# Patient Record
Sex: FEMALE | Race: WHITE | NOT HISPANIC OR LATINO | Employment: OTHER | URBAN - NONMETROPOLITAN AREA
[De-identification: names, ages, dates, MRNs, and addresses within clinical notes are randomized per-mention and may not be internally consistent; named-entity substitution may affect disease eponyms.]

---

## 2018-01-31 ENCOUNTER — PROCEDURE VISIT (OUTPATIENT)
Dept: INTERNAL MEDICINE | Facility: CLINIC | Age: 58
End: 2018-01-31

## 2018-01-31 VITALS
SYSTOLIC BLOOD PRESSURE: 130 MMHG | BODY MASS INDEX: 31.22 KG/M2 | WEIGHT: 187.4 LBS | DIASTOLIC BLOOD PRESSURE: 80 MMHG | HEIGHT: 65 IN

## 2018-01-31 DIAGNOSIS — R53.83 FATIGUE, UNSPECIFIED TYPE: ICD-10-CM

## 2018-01-31 DIAGNOSIS — Z01.419 ENCOUNTER FOR WELL WOMAN EXAM WITH ROUTINE GYNECOLOGICAL EXAM: Primary | ICD-10-CM

## 2018-01-31 DIAGNOSIS — Z12.4 ENCOUNTER FOR PAPANICOLAOU SMEAR FOR CERVICAL CANCER SCREENING: ICD-10-CM

## 2018-01-31 DIAGNOSIS — Z12.31 ENCOUNTER FOR SCREENING MAMMOGRAM FOR BREAST CANCER: ICD-10-CM

## 2018-01-31 DIAGNOSIS — Z12.11 SCREENING FOR COLON CANCER: ICD-10-CM

## 2018-01-31 PROCEDURE — 88142 CYTOPATH C/V THIN LAYER: CPT | Performed by: INTERNAL MEDICINE

## 2018-01-31 PROCEDURE — 99396 PREV VISIT EST AGE 40-64: CPT | Performed by: INTERNAL MEDICINE

## 2018-01-31 NOTE — PROGRESS NOTES
"Subjective   History of Present Illness    Nohelia Yo is a 57 y.o. female who presents for annual exam.      Patient denies any gyn complaints. .Denies pelvic pain, vaginal discharge or abnormal bleeding.    Sexually active?: Yes  Postmenopausal?: Yes  HRT?: Yes  Hysterectomy?: No    Date last pap:   History of abnormal Pap smear:No  Date of last mammogram:   History of abnormal mammogram: Yes. Patient had left breast biopsy in . Pathology - fibrocystic changes.  No family history of breast cancer or GYN cancer.    Colonoscopy - patient has never had colonoscopy.  She denies abdominal pain, nausea or vomiting. Denies constipation or diarrhea.  No family history of colon cancer.    Patient complains of fatigue and daytime somnolence.  She snores a lot but no witnessed episodes of apnea.      /80  Ht 164 cm (64.57\")  Wt 85 kg (187 lb 6.4 oz)  BMI 31.6 kg/m2    Past Medical History:   Diagnosis Date   • Abnormal computed tomography scan     - hilar and mediastinal lymphadenopathy      • Abnormal mammography     microcalcifications,left      • Dyspnea    • Extrinsic allergic alveolitis    • Hallux valgus        Past Surgical History:   Procedure Laterality Date   • BREAST BIOPSY  10/19/2009    Biop of L using percut vacuum assisted rotating device & image guidance. Placement of metallic localization clip. Stereotactic location guidance w/ radiologic supervision & interpretation. radiologic exam of surgical specimen Abn L micro calcificat   • FOOT SURGERY  2012    1st metatarsal osteotomy,right foot. Metatarsalgia with shortened 1st metatarsal   • METATARSAL OSTEOTOMY  2012    First metatarsal osteotomy, right foot       The following portions of the patient's history were reviewed and updated as appropriate: allergies, current medications, past family history, past medical history, past social history, past surgical history and problem list.    Review of " Systems    Constitutional:  Positive for fatigue. No weight loss, No weight gain, No fever and No chills   Respiratory: No dyspnea, No cough, No hemopytysis, No wheezing and No pleuritic pain   Cardiovascular: No chest pain, No palpitations, No arrhythmia, No orthopnea, No noctural dyspnea, No edema and No claudication   Breasts: No discharge from nipple, No breast tenderness and No breast mass   Gastrointestinal: No loss of appetite, No dysphagia, No abdominal pain, No nausea, No vomiting, No change in bowel habits, No diarrhea, No constipation and No blood in stool   Genitourinary: No increased frequency of urination, No dysuria, No hematuria, No nocturia, No urinary incontinence, No vaginal discharge, No abnormal vaginal bleeding, No pelvic pain, No menstrual problem and No menopausal problem   Skin: No skin rash, No skin lesion, No dry skin, No pruritus and No nail problem   Neurologic: No headache, No dizziness, No lightheadedness, No syncope, No vertigo, No weakness, No numbness, No tremor and No paresthesia   Psychiatric: No difficulty sleeping, No mood swings, No feeling anxious, No confusion and No memory loss          Objective   Physical Exam    General:  Alert and oriented x 3, Cooperative, Well developed & well nourished and No acute distress   HEENT: PERRLA, EOMI,Throat - clear   Lungs: Lungs, clear, good effort   Cardiac: Regular, no murmur or rub   Abdomen: Soft, nondistended, non-tender, without masses or organomegaly   Breast: Inspection negative, no nipple discharge or bleeding, no masses or nodularity palpable and Symmetrical breasts in shape, size, consistency   Genitourinary: Vulva normal, vaginal mucosa normal, bladder nondistended and nontender, cervix normal, uterus normal size and nontender, no adnexal/pelvic tenderness or masses, Bartholin's/[place]/Urethral gland WNL PAP smear obtained   Skin: Skin warm and dry and Pattern of hair growth normal       Assessment/Plan   Nohelia was seen  today for annual exam.    Diagnoses and all orders for this visit:    Encounter for well woman exam with routine gynecological exam  -     Liquid-based Pap Smear, Screening - , Cervix    Encounter for screening mammogram for breast cancer  -     Mammo Screening Digital Tomosynthesis Bilateral With CAD    Encounter for Papanicolaou smear for cervical cancer screening  -     Liquid-based Pap Smear, Screening - , Cervix    Screening for colon cancer  -     Ambulatory Referral to General Surgery    Fatigue, unspecified type  -     Ambulatory Referral to Sleep Medicine      All questions answered.  Recommended self breast exam  Pap smear obtained.  Mammogram.  Discussed healthy lifestyle modifications.  Recommended weight loss and regular physical activity.   Patient is referred for screening colonoscopy.   Sleep apnea evaluation.

## 2018-02-02 LAB
LAB AP CASE REPORT: NORMAL
LAB AP GYN ADDITIONAL INFORMATION: NORMAL
LAB AP GYN OTHER FINDINGS: NORMAL
Lab: NORMAL
PATH INTERP SPEC-IMP: NORMAL
STAT OF ADQ CVX/VAG CYTO-IMP: NORMAL

## 2018-02-16 ENCOUNTER — PREP FOR SURGERY (OUTPATIENT)
Dept: OTHER | Facility: HOSPITAL | Age: 58
End: 2018-02-16

## 2018-02-16 DIAGNOSIS — Z12.11 SCREENING FOR COLON CANCER: Primary | ICD-10-CM

## 2018-02-16 RX ORDER — DEXTROSE AND SODIUM CHLORIDE 5; .45 G/100ML; G/100ML
30 INJECTION, SOLUTION INTRAVENOUS CONTINUOUS PRN
Status: CANCELLED | OUTPATIENT
Start: 2018-02-16

## 2018-03-27 ENCOUNTER — ANESTHESIA EVENT (OUTPATIENT)
Dept: GASTROENTEROLOGY | Facility: HOSPITAL | Age: 58
End: 2018-03-27

## 2018-03-27 ENCOUNTER — HOSPITAL ENCOUNTER (OUTPATIENT)
Facility: HOSPITAL | Age: 58
Setting detail: HOSPITAL OUTPATIENT SURGERY
Discharge: HOME | End: 2018-03-27
Attending: SURGERY | Admitting: SURGERY

## 2018-03-27 ENCOUNTER — ANESTHESIA (OUTPATIENT)
Dept: GASTROENTEROLOGY | Facility: HOSPITAL | Age: 58
End: 2018-03-27

## 2018-03-27 VITALS
SYSTOLIC BLOOD PRESSURE: 120 MMHG | HEIGHT: 63 IN | TEMPERATURE: 97.5 F | WEIGHT: 185.19 LBS | BODY MASS INDEX: 32.81 KG/M2 | HEART RATE: 77 BPM | OXYGEN SATURATION: 99 % | DIASTOLIC BLOOD PRESSURE: 69 MMHG | RESPIRATION RATE: 16 BRPM

## 2018-03-27 PROCEDURE — 25010000002 PROPOFOL 10 MG/ML EMULSION: Performed by: NURSE ANESTHETIST, CERTIFIED REGISTERED

## 2018-03-27 PROCEDURE — 45378 DIAGNOSTIC COLONOSCOPY: CPT | Performed by: SURGERY

## 2018-03-27 PROCEDURE — 25010000002 MIDAZOLAM PER 1 MG: Performed by: NURSE ANESTHETIST, CERTIFIED REGISTERED

## 2018-03-27 PROCEDURE — 25010000002 FENTANYL CITRATE (PF) 100 MCG/2ML SOLUTION: Performed by: NURSE ANESTHETIST, CERTIFIED REGISTERED

## 2018-03-27 RX ORDER — DEXTROSE AND SODIUM CHLORIDE 5; .45 G/100ML; G/100ML
30 INJECTION, SOLUTION INTRAVENOUS CONTINUOUS
Status: DISCONTINUED | OUTPATIENT
Start: 2018-03-27 | End: 2018-03-27 | Stop reason: HOSPADM

## 2018-03-27 RX ORDER — FENTANYL CITRATE 50 UG/ML
INJECTION, SOLUTION INTRAMUSCULAR; INTRAVENOUS AS NEEDED
Status: DISCONTINUED | OUTPATIENT
Start: 2018-03-27 | End: 2018-03-27 | Stop reason: SURG

## 2018-03-27 RX ORDER — LIDOCAINE HYDROCHLORIDE 10 MG/ML
INJECTION, SOLUTION INFILTRATION; PERINEURAL AS NEEDED
Status: DISCONTINUED | OUTPATIENT
Start: 2018-03-27 | End: 2018-03-27 | Stop reason: SURG

## 2018-03-27 RX ORDER — MIDAZOLAM HYDROCHLORIDE 1 MG/ML
INJECTION INTRAMUSCULAR; INTRAVENOUS AS NEEDED
Status: DISCONTINUED | OUTPATIENT
Start: 2018-03-27 | End: 2018-03-27 | Stop reason: SURG

## 2018-03-27 RX ORDER — PROPOFOL 10 MG/ML
VIAL (ML) INTRAVENOUS AS NEEDED
Status: DISCONTINUED | OUTPATIENT
Start: 2018-03-27 | End: 2018-03-27 | Stop reason: SURG

## 2018-03-27 RX ADMIN — PROPOFOL 70 MG: 10 INJECTION, EMULSION INTRAVENOUS at 08:20

## 2018-03-27 RX ADMIN — FENTANYL CITRATE 25 MCG: 50 INJECTION, SOLUTION INTRAMUSCULAR; INTRAVENOUS at 08:17

## 2018-03-27 RX ADMIN — DEXTROSE AND SODIUM CHLORIDE 30 ML/HR: 5; 450 INJECTION, SOLUTION INTRAVENOUS at 07:34

## 2018-03-27 RX ADMIN — PROPOFOL 30 MG: 10 INJECTION, EMULSION INTRAVENOUS at 08:45

## 2018-03-27 RX ADMIN — DEXTROSE AND SODIUM CHLORIDE: 5; 450 INJECTION, SOLUTION INTRAVENOUS at 08:08

## 2018-03-27 RX ADMIN — MIDAZOLAM 0.5 MG: 1 INJECTION INTRAMUSCULAR; INTRAVENOUS at 08:17

## 2018-03-27 RX ADMIN — LIDOCAINE HYDROCHLORIDE 50 MG: 10 INJECTION, SOLUTION INFILTRATION; PERINEURAL at 08:16

## 2018-03-27 RX ADMIN — PROPOFOL 50 MG: 10 INJECTION, EMULSION INTRAVENOUS at 08:30

## 2018-03-27 NOTE — ANESTHESIA POSTPROCEDURE EVALUATION
Patient: Nohelia Yo    Procedure Summary     Date:  03/27/18 Room / Location:  [facility] ENDOSCOPY 1 / [facility]COPY    Anesthesia Start:  0815 Anesthesia Stop:  0850    Procedure:  COLONOSCOPY (N/A ) Diagnosis:       Colon cancer screening      (Colon cancer screening [Z12.11])    Surgeon:  Cam Salgado MD Provider:  Lawanda Jackson CRNA    Anesthesia Type:  MAC ASA Status:  2          Anesthesia Type: MAC  Last vitals  BP   151/82 (03/27/18 0720)   Temp   96.7 °F (35.9 °C) (03/27/18 0720)   Pulse   63 (03/27/18 0720)   Resp   18 (03/27/18 0720)     SpO2   96 % (03/27/18 0720)     Post Anesthesia Care and Evaluation    Patient location during evaluation: bedside  Patient participation: complete - patient participated  Level of consciousness: awake and alert  Pain management: adequate  Airway patency: patent  Anesthetic complications: No anesthetic complications    Cardiovascular status: acceptable  Respiratory status: acceptable  Hydration status: acceptable

## 2018-03-27 NOTE — ANESTHESIA PREPROCEDURE EVALUATION
Anesthesia Evaluation                  Airway   Mallampati: II  TM distance: <3 FB  Neck ROM: full  No difficulty expected  Dental - normal exam     Pulmonary - normal exam   (+) shortness of breath,   Cardiovascular - normal exam        Neuro/Psych  GI/Hepatic/Renal/Endo      Musculoskeletal     Abdominal  - normal exam   Substance History      OB/GYN          Other                        Anesthesia Plan    ASA 2     MAC     intravenous induction   Anesthetic plan and risks discussed with patient.

## 2018-03-27 NOTE — H&P
Chief Complaint: Need for Screening Colonoscopy    Nohelia Yo is a 58 y.o. female referred today for evaluation for colonoscopy.  She notes no change in bowel habits, no blood in the stool.     Prior Colonoscopy:no  Prior Polyps:no  Family History of Colon Cancer:no  On anticoagulation/antiplatelets:no    Past Surgical History:   Procedure Laterality Date   • BREAST BIOPSY  10/19/2009    Biop of L using percut vacuum assisted rotating device & image guidance. Placement of metallic localization clip. Stereotactic location guidance w/ radiologic supervision & interpretation. radiologic exam of surgical specimen Abn L micro calcificat   • FOOT SURGERY  08/21/2012    1st metatarsal osteotomy,right foot. Metatarsalgia with shortened 1st metatarsal   • METATARSAL OSTEOTOMY  08/21/2012    First metatarsal osteotomy, right foot     Past Medical History:   Diagnosis Date   • Abnormal computed tomography scan     - hilar and mediastinal lymphadenopathy      • Abnormal mammography     microcalcifications,left      • Dyspnea    • Extrinsic allergic alveolitis    • Hallux valgus      Social History     Social History   • Marital status:      Spouse name: N/A   • Number of children: N/A   • Years of education: N/A     Occupational History   • Not on file.     Social History Main Topics   • Smoking status: Never Smoker   • Smokeless tobacco: Never Used   • Alcohol use No   • Drug use: No   • Sexual activity: Defer     Other Topics Concern   • Not on file     Social History Narrative   • No narrative on file     No current facility-administered medications on file prior to encounter.      No current outpatient prescriptions on file prior to encounter.     No Known Allergies      Review of Systems   Constitutional: Negative for appetite change, chills, fever and unexpected weight change.   HENT: Negative for hearing loss, nosebleeds and trouble swallowing.    Eyes: Negative for visual disturbance.   Respiratory:  Negative for apnea, cough, choking, chest tightness, shortness of breath, wheezing and stridor.    Cardiovascular: Negative for chest pain, palpitations and leg swelling.   Gastrointestinal: Negative for abdominal distention, abdominal pain, blood in stool, constipation, diarrhea, nausea and vomiting.   Endocrine: Negative for cold intolerance, heat intolerance, polydipsia, polyphagia and polyuria.   Genitourinary: Negative for difficulty urinating, dysuria, frequency, hematuria and urgency.   Musculoskeletal: Negative for arthralgias, back pain, myalgias and neck pain.   Skin: Negative for color change, pallor and rash.   Allergic/Immunologic: Negative for immunocompromised state.   Neurological: Negative for dizziness, seizures, syncope, light-headedness, numbness and headaches.   Hematological: Negative for adenopathy.   Psychiatric/Behavioral: Negative for suicidal ideas. The patient is not nervous/anxious.        Vitals:    03/27/18 0720   BP: 151/82   Pulse: 63   Resp: 18   Temp: 96.7 °F (35.9 °C)   SpO2: 96%       Physical Exam:       General Appearance:    Alert, cooperative, in no acute distress   Head:    Normocephalic, without obvious abnormality, atraumatic   Eyes:            Lids and lashes normal, conjunctivae and sclerae normal, no   icterus, no pallor, corneas clear, PERRLA   Ears:    Ears appear intact with no abnormalities noted   Throat:   No oral lesions, no thrush, oral mucosa moist   Neck:   No adenopathy, supple, trachea midline, no thyromegaly, no   carotid bruit, no JVD   Back:     No kyphosis present, no scoliosis present, no skin lesions,      erythema or scars, no tenderness to percussion or                   palpation, range of motion normal   Lungs:     Clear to auscultation,respirations regular, even and                  unlabored    Heart:    Regular rhythm and normal rate, normal S1 and S2, no            murmur, no gallop, no rub, no click   Chest Wall:    No abnormalities observed    Abdomen:     Normal bowel sounds, no masses, no organomegaly, soft        non-tender, non-distended, no guarding, no rebound                Tenderness   Extremities:   Moves all extremities well, no edema, no cyanosis, no             redness   Pulses:   Pulses palpable and equal bilaterally   Skin:   No bleeding, bruising or rash   Lymph nodes:   No palpable adenopathy   Neurologic:   Cranial nerves 2 - 12 grossly intact, sensation intact, DTR       present and equal bilaterally       Assessment     In need of screening colonoscopy.    Plan     Risks, benefits, rationale and prep for colonoscopy have been discussed with the patient.  The patient indicates understanding of these issues and agrees with the plan.          This document has been electronically signed by Cam Salgado MD on March 27, 2018 8:11 AM

## 2018-04-07 DIAGNOSIS — Z02.1 PRE-EMPLOYMENT EXAMINATION: ICD-10-CM

## 2018-04-07 DIAGNOSIS — R53.83 FATIGUE, UNSPECIFIED TYPE: Primary | ICD-10-CM

## 2018-04-10 ENCOUNTER — CONSULT (OUTPATIENT)
Dept: SLEEP MEDICINE | Facility: HOSPITAL | Age: 58
End: 2018-04-10

## 2018-04-10 VITALS
BODY MASS INDEX: 32.43 KG/M2 | DIASTOLIC BLOOD PRESSURE: 78 MMHG | SYSTOLIC BLOOD PRESSURE: 124 MMHG | HEART RATE: 85 BPM | WEIGHT: 183 LBS | OXYGEN SATURATION: 98 % | HEIGHT: 63 IN

## 2018-04-10 DIAGNOSIS — G47.33 OBSTRUCTIVE SLEEP APNEA, ADULT: Primary | ICD-10-CM

## 2018-04-10 PROCEDURE — 99204 OFFICE O/P NEW MOD 45 MIN: CPT | Performed by: INTERNAL MEDICINE

## 2018-04-10 NOTE — PROGRESS NOTES
New Patient Sleep Medicine Consultation    Encounter Date: 4/10/2018         Patient's PCP: Alondra Dean MD  Referring provider: Alondra Dean MD  Reason for consultation: snoring and excessive daytime sleepiness    Nohelia Yo is a 58 y.o. female who presents with above complaints for many years. She  admits to daytime fatigue, working night shift, muscle cramps at night, bruxism, restless sleep, and non-restorative sleep. 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 She  falls asleep after 5 minutes, and is up 3-5 times per night. She wakes up at various times. She drinks 1 cup of coffee when working, 4 teas, and 0 sodas per day. She drinks 0 alcoholic beverages per week. She does not smoke. She denies abnormal dreams, cataplexy, sleep paralysis, or hypnagogic hallucinations. She does not take sedatives or hypnotics. She has no sleepiness with driving. She rarely naps. Restless legs symptoms are not present.    Nunda - 8    Past Medical History:   Diagnosis Date   • Abnormal computed tomography scan     - hilar and mediastinal lymphadenopathy      • Abnormal mammography     microcalcifications,left      • Dyspnea    • Extrinsic allergic alveolitis    • Hallux valgus      Social History     Social History   • Marital status:      Spouse name: N/A   • Number of children: N/A   • Years of education: N/A     Occupational History   • Not on file.     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 Patient advised to discuss any positive ROS with PCP.      Vitals:    04/10/18 0828   BP: 124/78   Pulse: 85   SpO2: 98%     Body mass index is 32.42 kg/m². Discussed the patient's BMI with her. BMI is above normal parameters. Follow-up plan includes:  referral to primary care.  Neck circumference: 14.5\"            General: Alert. Cooperative. Well developed. No acute distress.             Head:  Normocephalic. Symmetrical. Atraumatic.              Eyes: Sclera clear. No icterus. PERRLA. Normal EOM.             Ears: No deformities. Normal hearing.             Nose: No septal deviation. No drainage.          Throat: No oral lesions. No thrush. Moist mucous membranes.    Tongue is normal    Dentition is good       Pharynx: Posterior pharyngeal pillars are narrow    Mallampati score of IV (only hard palate visible)    Pharynx is normal with unrermarkable tonsils   Chest Wall:  Normal shape. Symmetric expansion with respiration. No tenderness.             Neck:  Trachea midline.           Lungs:  Clear to auscultation bilaterally. No wheezes. No rhonchi. No rales. Respirations regular, even and unlabored.            Heart:  Regular rhythm and normal rate. Normal S1 and S2. No murmur.     Abdomen:  Soft, non-tender and non-distended. Normal bowel sounds. No masses.  Extremities:  Moves all extremities well. No edema.           Pulses: Pulses palpable and equal bilaterally.               Skin: Dry. Intact. No bleeding. No rash.           Neuro: Moves all 4 extremities and cranial nerves grossly intact.  Psychiatric: Normal mood and affect.      ASSESSMENT:  1. Obstructive sleep apnea   1. Home sleep study           This document has been electronically signed by Rik Avina MD on April 10, 2018         CC: MD Dianne Vinson, MD Alondra  "

## 2018-04-15 RX ORDER — ATOVAQUONE AND PROGUANIL HYDROCHLORIDE 250; 100 MG/1; MG/1
1 TABLET, FILM COATED ORAL
Qty: 30 TABLET | Refills: 0 | Status: SHIPPED | OUTPATIENT
Start: 2018-04-15 | End: 2019-06-20

## 2018-05-21 ENCOUNTER — HOSPITAL ENCOUNTER (OUTPATIENT)
Dept: SLEEP MEDICINE | Facility: HOSPITAL | Age: 58
Discharge: HOME | End: 2018-05-21

## 2018-06-05 ENCOUNTER — HOSPITAL ENCOUNTER (OUTPATIENT)
Dept: SLEEP MEDICINE | Facility: HOSPITAL | Age: 58
Discharge: HOME | End: 2018-06-05
Attending: INTERNAL MEDICINE | Admitting: INTERNAL MEDICINE

## 2018-06-05 DIAGNOSIS — G47.33 OBSTRUCTIVE SLEEP APNEA, ADULT: ICD-10-CM

## 2018-06-05 PROCEDURE — 95806 SLEEP STUDY UNATT&RESP EFFT: CPT

## 2018-06-05 PROCEDURE — 95806 SLEEP STUDY UNATT&RESP EFFT: CPT | Performed by: INTERNAL MEDICINE

## 2018-06-19 ENCOUNTER — OFFICE VISIT (OUTPATIENT)
Dept: SLEEP MEDICINE | Facility: HOSPITAL | Age: 58
End: 2018-06-19

## 2018-06-19 VITALS
DIASTOLIC BLOOD PRESSURE: 82 MMHG | HEIGHT: 63 IN | SYSTOLIC BLOOD PRESSURE: 148 MMHG | WEIGHT: 183 LBS | BODY MASS INDEX: 32.43 KG/M2

## 2018-06-19 DIAGNOSIS — G47.26 CIRCADIAN RHYTHM SLEEP DISORDER, SHIFT WORK TYPE: ICD-10-CM

## 2018-06-19 DIAGNOSIS — G47.19 EXCESSIVE DAYTIME SLEEPINESS: Primary | ICD-10-CM

## 2018-06-19 DIAGNOSIS — G47.25 CIRCADIAN RHYTHM SLEEP DISORDER, JET LAG TYPE: ICD-10-CM

## 2018-06-19 PROCEDURE — 99213 OFFICE O/P EST LOW 20 MIN: CPT | Performed by: INTERNAL MEDICINE

## 2018-06-19 NOTE — PROGRESS NOTES
CHIEF COMPLAINT: follow up home sleep study    HPI:    The patient is a 58 y.o. female.  She returns to sleep clinic to discuss the results of the recent home sleep study performed on 06/05/2018.  The AHI/IAIN was 3.3. Patient reports difficulty falling asleep on study night. She was reading in bed and had trouble staying asleep. She reports ~ 3 hours of sleep that night.    INTERVAL MEDICAL HISTORY: no change since last visit.    MEDICATIONS:   Current Outpatient Prescriptions:   •  atovaquone-proguanil (MALARONE) 250-100 MG tablet per tablet, Take 1 tablet by mouth Daily., Disp: 30 tablet, Rfl: 0    PHYSICAL EXAM  Vital Signs (last 24 hours)       06/18 0700  -  06/19 0659 06/19 0700  -  06/19 0948   Most Recent    BP     148/82     148/82          Gen:  No distress, appears stated age, alert, oriented to person, place, and time  Heent:   NC/AT, PERRLA, EOMI, anicteric sclera    External ears/nose normal, OP clear, Mallamati 3  LUNGS: Clear breath sounds bilaterally, nonlabored breathing  CV:  Normal S1/S2, without murmur, no peripheral edema  ABD:  Non tender, non distended, bowel sounds not appreciated  EXT:  No cyanosis or clubbing      IMPRESSION AND PLAN:    1. Excessive daytime sleepiness  1. HST with short sleep time  1. Check in lab PSG with MSLT  2. Circadian rhythm disorder  1. Shift work sleep disorder  2. Jet lag - spends up to 3 nights in her bed, travels for [place], pleasure    All of the patient's questions were answered. She states understanding and agreement with my assessment and plan as above.  Total time 15 min, more than half spent in face to face counseling and coordination of care.    RTC 2 weeks after testing    She agrees to return sooner on a prn basis.             This document has been electronically signed by Rik Avina MD on June 19, 2018           CC: Alondra Dean MD          No ref. provider found

## 2018-09-01 RX ORDER — ACETAZOLAMIDE 500 MG/1
500 CAPSULE, EXTENDED RELEASE ORAL 2 TIMES DAILY
Qty: 60 CAPSULE | Refills: 2 | Status: SHIPPED | OUTPATIENT
Start: 2018-09-01 | End: 2019-06-20

## 2018-09-05 DIAGNOSIS — G47.19 EXCESSIVE DAYTIME SLEEPINESS: Primary | ICD-10-CM

## 2018-09-18 ENCOUNTER — HOSPITAL ENCOUNTER (OUTPATIENT)
Dept: SLEEP MEDICINE | Facility: HOSPITAL | Age: 58
Discharge: HOME | End: 2018-09-18

## 2018-09-20 ENCOUNTER — TELEPHONE (OUTPATIENT)
Dept: SLEEP MEDICINE | Facility: HOSPITAL | Age: 58
End: 2018-09-20

## 2018-09-20 DIAGNOSIS — G47.33 OSA (OBSTRUCTIVE SLEEP APNEA): Primary | ICD-10-CM

## 2018-09-20 NOTE — TELEPHONE ENCOUNTER
----- Message from Wendy Nava sent at 9/17/2018  2:20 PM CDT -----  Regarding: SLEEP STUDIES  Contact: [phone]  Patient called canceled sleep studies and wants to do another HST. She said she could not afford the cost of the studies. She had a HST done 6/058/2018  '  9/20/2018  9:24 AM  Patient's symptoms persists -will repeat HST

## 2018-12-11 ENCOUNTER — HOSPITAL ENCOUNTER (OUTPATIENT)
Dept: SLEEP MEDICINE | Facility: HOSPITAL | Age: 58
Discharge: HOME | End: 2018-12-11
Admitting: INTERNAL MEDICINE

## 2018-12-11 DIAGNOSIS — G47.33 OSA (OBSTRUCTIVE SLEEP APNEA): ICD-10-CM

## 2018-12-11 PROCEDURE — 95806 SLEEP STUDY UNATT&RESP EFFT: CPT | Performed by: INTERNAL MEDICINE

## 2018-12-11 PROCEDURE — 95806 SLEEP STUDY UNATT&RESP EFFT: CPT

## 2019-01-17 DIAGNOSIS — R53.83 OTHER FATIGUE: Primary | ICD-10-CM

## 2019-06-20 ENCOUNTER — OFFICE VISIT (OUTPATIENT)
Dept: FAMILY MEDICINE CLINIC | Facility: CLINIC | Age: 59
End: 2019-06-20

## 2019-06-20 VITALS
DIASTOLIC BLOOD PRESSURE: 88 MMHG | SYSTOLIC BLOOD PRESSURE: 140 MMHG | WEIGHT: 178 LBS | BODY MASS INDEX: 30.39 KG/M2 | HEIGHT: 64 IN

## 2019-06-20 DIAGNOSIS — M54.2 NECK PAIN: ICD-10-CM

## 2019-06-20 DIAGNOSIS — Z12.31 ENCOUNTER FOR SCREENING MAMMOGRAM FOR MALIGNANT NEOPLASM OF BREAST: ICD-10-CM

## 2019-06-20 DIAGNOSIS — N62 LARGE BREASTS: ICD-10-CM

## 2019-06-20 DIAGNOSIS — Z00.00 WELLNESS EXAMINATION: Primary | ICD-10-CM

## 2019-06-20 PROBLEM — Z76.89 ENCOUNTER TO ESTABLISH CARE: Status: ACTIVE | Noted: 2019-06-20

## 2019-06-20 PROCEDURE — 99396 PREV VISIT EST AGE 40-64: CPT | Performed by: NURSE PRACTITIONER

## 2019-06-20 NOTE — PROGRESS NOTES
Chief Complaint   Patient presents with   • [facility] Care     Dr Dean patient     Subjective   Nohelia Yo is a 59 y.o. female.     Presents with needing to est care-reports neck pain and large breast tissue affecting her daily life-workouts, neck pain and now has headaches       Neck Pain    This is a recurrent problem. The current episode started more than 1 year ago. The problem occurs daily. The problem has been gradually worsening. The pain is associated with nothing. The pain is present in the anterior neck. The quality of the pain is described as aching, cramping, shooting and stabbing. The pain is at a severity of 9/10. The pain is severe. The symptoms are aggravated by bending (large breast ). The pain is same all the time. Stiffness is present all day. Associated symptoms include headaches. Pertinent negatives include no chest pain, fever, leg pain, numbness, pain with swallowing, paresis, photophobia, syncope, tingling, trouble swallowing, visual change or weakness. She has tried acetaminophen, heat, home exercises, neck support, muscle relaxants, NSAIDs and bed rest for the symptoms. The treatment provided mild relief.   Breast Problem   Associated symptoms include arthralgias, headaches, joint swelling and neck pain. Pertinent negatives include no abdominal pain, chest pain, chills, congestion, coughing, diaphoresis, fatigue, fever, myalgias, nausea, numbness, rash, sore throat, visual change, vomiting or weakness.        The following portions of the patient's history were reviewed and updated as appropriate: allergies, current medications, past social history and problem list.    Review of Systems   Constitutional: Negative.  Negative for activity change, appetite change, chills, diaphoresis, fatigue, fever and unexpected weight change.   HENT: Negative for congestion, dental problem, drooling, ear discharge, ear pain, facial swelling, hearing loss, mouth sores, nosebleeds, postnasal drip,  "rhinorrhea, sinus pressure, sinus pain, sneezing, sore throat, tinnitus, trouble swallowing and voice change.    Eyes: Negative.  Negative for photophobia, pain, discharge, redness, itching and visual disturbance.   Respiratory: Negative.  Negative for apnea, cough, choking, chest tightness, shortness of breath, wheezing and stridor.    Cardiovascular: Negative.  Negative for chest pain, palpitations, leg swelling and syncope.   Gastrointestinal: Negative.  Negative for abdominal distention, abdominal pain, anal bleeding, blood in stool, constipation, diarrhea, nausea, rectal pain and vomiting.   Endocrine: Negative.  Negative for cold intolerance, heat intolerance, polydipsia, polyphagia and polyuria.   Genitourinary: Negative.  Negative for decreased urine volume, difficulty urinating, dyspareunia, dysuria, enuresis, flank pain, frequency, genital sores, hematuria, menstrual problem, pelvic pain, urgency, vaginal bleeding, vaginal discharge and vaginal pain.   Musculoskeletal: Positive for arthralgias, gait problem, joint swelling, neck pain and neck stiffness. Negative for back pain and myalgias.        Neck pain and headaches    Skin: Negative.  Negative for color change, pallor, rash and wound.   Allergic/Immunologic: Negative.  Negative for environmental allergies, food allergies and immunocompromised state.   Neurological: Positive for headaches. Negative for dizziness, tingling, tremors, seizures, syncope, facial asymmetry, speech difficulty, weakness, light-headedness and numbness.   Hematological: Negative.  Negative for adenopathy. Does not bruise/bleed easily.   Psychiatric/Behavioral: Negative.  Negative for agitation, behavioral problems, confusion, decreased concentration, dysphoric mood, hallucinations, self-injury, sleep disturbance and suicidal ideas. The patient is not nervous/anxious and is not hyperactive.        Objective   /88   Ht 162.6 cm (64\")   Wt 80.7 kg (178 lb)   BMI 30.55 " kg/m²   Physical Exam   Constitutional: She is oriented to person, place, and time. She appears well-developed and well-nourished. No distress.   HENT:   Head: Normocephalic and atraumatic.   Right Ear: External ear normal.   Left Ear: External ear normal.   Nose: Nose normal.   Mouth/Throat: Oropharynx is clear and moist. No oropharyngeal exudate.   Eyes: Conjunctivae and EOM are normal. Pupils are equal, round, and reactive to light. Right eye exhibits no discharge. Left eye exhibits no discharge. No scleral icterus.   Neck: Normal range of motion. Neck supple. No JVD present. No tracheal deviation present. No thyromegaly present.   Cardiovascular: Normal rate, regular rhythm, normal heart sounds and intact distal pulses. Exam reveals no gallop and no friction rub.   No murmur heard.  Pulmonary/Chest: Effort normal and breath sounds normal. No stridor. No respiratory distress. She has no wheezes. She has no rales. She exhibits no tenderness.   Abdominal: Soft. Bowel sounds are normal. She exhibits no distension and no mass. There is no tenderness. There is no rebound and no guarding. No hernia.   Musculoskeletal: Normal range of motion. She exhibits tenderness. She exhibits no edema or deformity.        Cervical back: She exhibits pain and spasm.        Back:    Indentations both shoulders from bra-large breast size-for body size    Lymphadenopathy:     She has no cervical adenopathy.   Neurological: She is alert and oriented to person, place, and time. She displays normal reflexes. No cranial nerve deficit or sensory deficit. She exhibits normal muscle tone. Coordination normal.   Skin: Skin is warm and dry. No rash noted. She is not diaphoretic. No erythema. No pallor.   Psychiatric: She has a normal mood and affect. Her behavior is normal. Judgment and thought content normal.   Nursing note and vitals reviewed.    Varghese Auguste MD 6/20/2019       Narrative       PROCEDURE: Three view cervical  spine    COMPARISON: No comparison    HISTORY: Cervicalgia    FINDINGS:   AP, lateral and open mouth odontoid views are obtained of the  cervical spine.   The odontoid process is intact. The lateral masses of C1  demonstrate normal anatomic alignment and symmetry about the  dens.   There is disc space narrowing at C5-C6.  The cervical vertebra demonstrate normal vertebral body heights,  alignment and otherwise normal intervertebral disc spacing.  There is no evidence of acute fracture or subluxation.   The prevertebral soft tissues are within normal limits.      Impression     CONCLUSION:    Degenerative changes at C5-C6. Otherwise unremarkable exam.    Electronically signed by:  Varghese Auguste MD  6/20/2019 10:38 AM  CDT Workstation: [id number]         Assessment/Plan   Problem List Items Addressed This Visit        Nervous and Auditory    Neck pain    Relevant Orders    Ambulatory Referral to Physical Therapy    XR Spine Cervical 2 or 3 View (Completed)       Other    Encounter to establish care - Primary    Large breasts    Relevant Orders    Ambulatory Referral to Physical Therapy    XR Spine Cervical 2 or 3 View (Completed)    Encounter for screening mammogram for malignant neoplasm of breast    Relevant Orders    Mammo Screening Digital Tomosynthesis Bilateral With CAD         No orders of the defined types were placed in this encounter.       It's not just what you eat, but when you eat  Eat breakfast, and eat smaller meals throughout the day. A healthy breakfast can jumpstart your metabolism, while eating small, healthy meals (rather than the standard three large meals) keeps your energy up.   Avoid eating at night. Try to eat dinner earlier and fast for 14-16 hours until breakfast the next morning. Studies suggest that eating only when you’re most active and giving your digestive system a long break each day may help to regulate weight.     Refer to physical therapy, xray of neck , see me back in 8 weeks as  directed, labs as directed  Varghese Auguste MD 6/20/2019       Narrative       PROCEDURE: Three view cervical spine    COMPARISON: No comparison    HISTORY: Cervicalgia    FINDINGS:   AP, lateral and open mouth odontoid views are obtained of the  cervical spine.   The odontoid process is intact. The lateral masses of C1  demonstrate normal anatomic alignment and symmetry about the  dens.   There is disc space narrowing at C5-C6.  The cervical vertebra demonstrate normal vertebral body heights,  alignment and otherwise normal intervertebral disc spacing.  There is no evidence of acute fracture or subluxation.   The prevertebral soft tissues are within normal limits.      Impression     CONCLUSION:    Degenerative changes at C5-C6. Otherwise unremarkable exam.    Electronically signed by:  Varghese Auguste MD  6/20/2019 10:38 AM  CDT Workstation: [id number]        Plan -labs as directed, refer to pt for neck pain follow up with me afterwards-exercise and weight discussed, proper fitting bra size, follow up with me as directed

## 2019-06-27 ENCOUNTER — HOSPITAL ENCOUNTER (OUTPATIENT)
Dept: PHYSICAL THERAPY | Facility: HOSPITAL | Age: 59
Setting detail: THERAPIES SERIES
Discharge: HOME | End: 2019-06-27

## 2019-06-27 DIAGNOSIS — M54.2 NECK PAIN: Primary | ICD-10-CM

## 2019-06-27 PROCEDURE — 97161 PT EVAL LOW COMPLEX 20 MIN: CPT | Performed by: PHYSICAL THERAPIST

## 2019-06-27 NOTE — THERAPY EVALUATION
Outpatient Physical Therapy Ortho Initial Evaluation  [facility]     Patient Name: Nohelia Yo  : 1960  MRN: [id number]  Today's Date: 2019      Visit Date: 2019    Patient Active Problem List   Diagnosis   • Encounter to establish care   • Neck pain   • Large breasts   • Encounter for screening mammogram for malignant neoplasm of breast        Past Medical History:   Diagnosis Date   • Abnormal computed tomography scan     - hilar and mediastinal lymphadenopathy      • Abnormal mammography     microcalcifications,left      • Dyspnea    • Extrinsic allergic alveolitis (CMS/HCC)    • Hallux valgus         Past Surgical History:   Procedure Laterality Date   • BREAST BIOPSY  10/19/2009    Biop of L using percut vacuum assisted rotating device & image guidance. Placement of metallic localization clip. Stereotactic location guidance w/ radiologic supervision & interpretation. radiologic exam of surgical specimen Abn L micro calcificat   • COLONOSCOPY N/A 3/27/2018    Procedure: COLONOSCOPY;  Surgeon: Cam Salgado MD;  Location: [facility] ENDOSCOPY;  Service: General   • FOOT SURGERY  2012    1st metatarsal osteotomy,right foot. Metatarsalgia with shortened 1st metatarsal   • METATARSAL OSTEOTOMY  2012    First metatarsal osteotomy, right foot       Visit Dx:     ICD-10-CM ICD-9-CM   1. Neck pain M54.2 723.1         Patient History     Row Name 19 1155             History    Chief Complaint  Pain;Difficulty with daily activities  -BB      Type of Pain  Neck pain  -BB      Brief Description of Current Complaint  Reports being a active patient that enjoys traveling and doing sporting activities. Patient notes pain of recent in neck while scuba diving, notes pain in neck when completing sutures while looking down for prolonged period of time. Notes right hand occasionally has tingling. Notes tension in neck with looking at computer screen at work. Has massages  that helps with discomforts.   -BB      Patient/Caregiver Goals  Return to prior level of function  -BB      Patient's Rating of General Health  Very good  -BB      Hand Dominance  right-handed  -BB      Occupation/sports/leisure activities  ER MD   -BB         Pain     Pain Location  Neck  -BB      Pain at Present  5  -BB      Pain Description  Aching tension   -BB        User Key  (r) = Recorded By, (t) = Taken By, (c) = Cosigned By    Initials Name Provider Type    Berkley Jeffrey PT Physical Therapist          PT Ortho     Row Name 06/27/19 4515       Subjective Comments    Subjective Comments  See patient history   -BB       Precautions and Contraindications    Precautions/Limitations  no known precautions/limitations  -BB       Subjective Pain    Pre-Treatment Pain Level  5  -BB    Post-Treatment Pain Level  -- not noted   -BB       Special Tests/Palpation    Special Tests/Palpation  Cervical/Thoracic  -BB       Cervical Palpation    Cervical Palpation- Location?  SCM;Middle traps;Upper traps;Suboccipital;Lower traps;Spinous process  -BB    Suboccipital  Bilateral:;Guarded/taut;Tender R>L  -BB    SCM  Bilateral:;Tender;Guarded/taut R>L  -BB    Spinous Process  -- C-spine mobility WNL. Decreased mobility of tspine   -BB    Upper Traps  Bilateral:;Tender;Guarded/taut R>L  -BB    Middle Traps  Bilateral:;Tender;Guarded/taut r>L  -BB    Lower Traps  Guarded/taut  -BB       Thoracic Accessory Motions    Thoracic Accessory Motions Tested?  Yes  -BB       General ROM    GENERAL ROM COMMENTS  Cervical is WNL with pulling noted with rotation and sidebend   -BB       MMT (Manual Muscle Testing)    General MMT Comments  UE grossly 5/5, shrug grossly 5/5  -BB       Sensation    Sensation WNL?  WNL  -BB    Light Touch  No apparent deficits  -BB      User Key  (r) = Recorded By, (t) = Taken By, (c) = Cosigned By    Initials Name Provider Type    Berkley Jeffrey PT Physical Therapist                      Therapy  Education  Education Details: Supine towel stretch, tennis balls in socks for tspine   Given: Other (comment)  Program: New  How Provided: Verbal  Provided to: Patient  Level of Understanding: Verbalized     PT OP Goals     Row Name 06/27/19 1155          PT Short Term Goals    STG Date to Achieve  -- defer STG  -BB        Long Term Goals    LTG Date to Achieve  08/08/19  -BB     LTG 1  Independent in HEP   -BB     LTG 2  Decrease frequency of headaches per subjective report   -BB     LTG 3  Decreased tenderness noted of middle traps and SCM   -BB        Time Calculation    PT Goal Re-Cert Due Date  07/18/19  -BB       User Key  (r) = Recorded By, (t) = Taken By, (c) = Cosigned By    Initials Name Provider Type    Berkley Jeffrey PT Physical Therapist          PT Assessment/Plan     Row Name 06/27/19 1155          PT Assessment    Functional Limitations  Limitations in functional capacity and performance  -BB     Impairments  Poor body mechanics;Posture;Pain  -BB     Assessment Comments  Patient presents today a very active with leisure activities and with a busy work schedule that has neck pain associated. Patient notes headaches to be multiple times a week and has pain noted of the neck with activities that is eased with rest. No deficits noted of shoulders. Decreased right shoulder shrug compared to left. Has noted numbness of right hand that does not appear associated with neck. Tone of the SCM, traps and suboccipitals noted R>L. Patient could benefit from skilled PT to improve thoracic mobility and tissue mobility of the neck with improved scapular stabilization.   -BB     Rehab Potential  Good  -BB     Patient/caregiver participated in establishment of treatment plan and goals  Yes  -BB     Patient would benefit from skilled therapy intervention  Yes  -BB        PT Plan    PT Frequency  Other (comment);2x/week or as patients schedule allows   -BB     Predicted Duration of Therapy Intervention (Therapy Eval)   6 weeks   -BB     Planned CPT's?  PT EVAL LOW COMPLEXITY: 18172;PT RE-EVAL: 85247;PT THER PROC EA 15 MIN: 67184;PT THER ACT EA 15 MIN: 72880;PT MANUAL THERAPY EA 15 MIN: 37790;PT ELECTRICAL STIM UNATTEND: ;PT ELECTRICAL STIM ATTD EA 15 MIN: 12379;PT ULTRASOUND EA 15 MIN: 72963;PT THER SUPP EA 15 MIN  -BB     PT Plan Comments  manual therapy to neck musculature, joint mobilizations to thoracic spine, dry needling as needed, ROM, scapular stablization   -BB       User Key  (r) = Recorded By, (t) = Taken By, (c) = Cosigned By    Initials Name Provider Type    Berkley Jeffrey PT Physical Therapist            Exercises     Row Name 06/27/19 1155             Subjective Comments    Subjective Comments  See patient history   -BB         Subjective Pain    Able to rate subjective pain?  yes  -BB      Pre-Treatment Pain Level  5  -BB      Post-Treatment Pain Level  -- not noted   -BB         Exercise 1    Exercise Name 1  Cross friction to Middle traps R>L   -BB        User Key  (r) = Recorded By, (t) = Taken By, (c) = Cosigned By    Initials Name Provider Type    Berkley Jeffrey, CANDIDA Physical Therapist                        Outcome Measure Options: Neck Disability Index (NDI)  Neck Disability Index  Section 1 - Pain Intensity: The pain is very mild at the moment.  Section 2 - Personal Care: I can look after myself normally without causing extra pain.  Section 3 - Lifting: I can lift heavy weights without causing extra pain  Section 4 - Work: I can only do my usual work, but no more  Section 5 - Headaches: I have moderate headaches that come frequently  Section 6 - Concentration: I can concentrate fully without difficulty.  Section 7 - Sleeping: I have no trouble sleeping.  Section 8 - Driving: I can drive as long as I want with slight neck pain.  Section 9 - Reading: I can read as much as I want with no neck pain.  Section 10 - Recreation: I have some neck pain with all recreational activities.  Neck Disability  Index Score: 7      Time Calculation:     Start Time: 1155  Stop Time: 1235  Time Calculation (min): 40 min     Therapy Charges for Today     Code Description Service Date Service Provider Modifiers Qty    27574118244  PT AQUA EVAL LOW COMPLEXITY 3 6/27/2019 Berkley Jansen, PT GP 1          PT G-Codes  Outcome Measure Options: Neck Disability Index (NDI)  Neck Disability Index Score: 7         Berkley Jansen, PT  6/27/2019

## 2019-07-01 ENCOUNTER — HOSPITAL ENCOUNTER (OUTPATIENT)
Dept: PHYSICAL THERAPY | Facility: HOSPITAL | Age: 59
Setting detail: THERAPIES SERIES
Discharge: HOME | End: 2019-07-01

## 2019-07-01 DIAGNOSIS — M54.2 NECK PAIN: Primary | ICD-10-CM

## 2019-07-01 PROCEDURE — 97140 MANUAL THERAPY 1/> REGIONS: CPT | Performed by: PHYSICAL THERAPIST

## 2019-07-01 NOTE — THERAPY TREATMENT NOTE
Outpatient Physical Therapy Ortho Treatment Note  [facility]     Patient Name: Nohelia Yo  : 1960  MRN: [id number]  Today's Date: 2019      Visit Date: 2019  Visit:2/2  % Imp: 0%  Private Pay   Visit Dx:    ICD-10-CM ICD-9-CM   1. Neck pain M54.2 723.1       Patient Active Problem List   Diagnosis   • Encounter to establish care   • Neck pain   • Large breasts   • Encounter for screening mammogram for malignant neoplasm of breast        Past Medical History:   Diagnosis Date   • Abnormal computed tomography scan     - hilar and mediastinal lymphadenopathy      • Abnormal mammography     microcalcifications,left      • Dyspnea    • Extrinsic allergic alveolitis (CMS/HCC)    • Hallux valgus         Past Surgical History:   Procedure Laterality Date   • BREAST BIOPSY  10/19/2009    Biop of L using percut vacuum assisted rotating device & image guidance. Placement of metallic localization clip. Stereotactic location guidance w/ radiologic supervision & interpretation. radiologic exam of surgical specimen Abn L micro calcificat   • COLONOSCOPY N/A 3/27/2018    Procedure: COLONOSCOPY;  Surgeon: Cam Salgado MD;  Location: [facility] ENDOSCOPY;  Service: General   • FOOT SURGERY  2012    1st metatarsal osteotomy,right foot. Metatarsalgia with shortened 1st metatarsal   • METATARSAL OSTEOTOMY  2012    First metatarsal osteotomy, right foot       PT Ortho     Row Name 19 0700       Subjective Comments    Subjective Comments  No new complaints. Agrees to POC for dry needling and manual therapy focused treatment. Notes pain anterior neck is common.   -BB       Precautions and Contraindications    Precautions/Limitations  no known precautions/limitations  -BB       Subjective Pain    Able to rate subjective pain?  yes  -BB    Subjective Pain Comment  Pain not noted. Reported doing okay  -BB       Posture/Observations    Posture/Observations Comments  Increased thoracic  kyphosis. No segmental rotations noted. Tone in traps and SCM  -BB      User Key  (r) = Recorded By, (t) = Taken By, (c) = Cosigned By    Initials Name Provider Type    Berkley Jeffrey, PT Physical Therapist                      PT Assessment/Plan     Row Name 07/01/19 0700          PT Assessment    Assessment Comments  Patient present with tone and tenderness in SCM and middle traps with strong muscle twitches with dry needling today. Hypomobility of the thoracic spine seen.   -BB        PT Plan    PT Frequency  1x/week;2x/week  -BB     Predicted Duration of Therapy Intervention (Therapy Eval)  6 weeks   -BB     PT Plan Comments  joint mobilizations, dry needling, scapular mobilizations  -BB       User Key  (r) = Recorded By, (t) = Taken By, (c) = Cosigned By    Initials Name Provider Type    Berkley Jeffrey, PT Physical Therapist          Modalities     Row Name 07/01/19 0700             Ice    Patient reports will apply ice at home to involved area  Yes ice to go  -BB        User Key  (r) = Recorded By, (t) = Taken By, (c) = Cosigned By    Initials Name Provider Type    Berkley Jeffrey, PT Physical Therapist        Exercises     Row Name 07/01/19 0700             Subjective Comments    Subjective Comments  No new complaints. Agrees to POC for dry needling and manual therapy focused treatment. Notes pain anterior neck is common.   -BB         Subjective Pain    Able to rate subjective pain?  yes  -BB      Subjective Pain Comment  Pain not noted. Reported doing okay  -BB         Exercise 1    Exercise Name 1  joint mobilizations to thoracic spine grade 2-3  -BB      Time 1  5'  -BB         Exercise 2    Exercise Name 2  Dry needling to right middle trap and right SCM   -BB      Time 2  10'  -BB      Additional Comments  strong twitches noted in each muscle   -BB         Exercise 3    Exercise Name 3  CT sitting grade 5 mobilization   -BB      Time 3  1'  -BB        User Key  (r) = Recorded By, (t) = Taken By,  (c) = Cosigned By    Initials Name Provider Type    Berkley Jeffrey PT Physical Therapist                       PT OP Goals     Row Name 07/01/19 0700          PT Short Term Goals    STG Date to Achieve  -- defer STG  -BB        Long Term Goals    LTG Date to Achieve  08/08/19  -BB     LTG 1  Independent in HEP   -BB     LTG 2  Decrease frequency of headaches per subjective report   -BB     LTG 3  Decreased tenderness noted of middle traps and SCM   -BB        Time Calculation    PT Goal Re-Cert Due Date  07/08/19  -BB       User Key  (r) = Recorded By, (t) = Taken By, (c) = Cosigned By    Initials Name Provider Type    Berkley Jeffrey PT Physical Therapist          Therapy Education  Education Details: Drink water and ice to aide in reducing soreness from dry needling   Given: Other (comment)              Time Calculation:   Start Time: 0702  Stop Time: 0730  Time Calculation (min): 28 min  PT Non-Billable Time (min): 10 min  Therapy Charges for Today     Code Description Service Date Service Provider Modifiers Qty    64378064030 HC PT THER SUPP EA 15 MIN 7/1/2019 Berkley Jansen, PT GP 1    68368619041 HC PT MANUAL THERAPY EA 15 MIN 7/1/2019 Berkley Jansen PT GP 1                    Berkley Jansen PT  7/1/2019

## 2019-07-08 ENCOUNTER — HOSPITAL ENCOUNTER (OUTPATIENT)
Dept: PHYSICAL THERAPY | Facility: HOSPITAL | Age: 59
Setting detail: THERAPIES SERIES
End: 2019-07-08

## 2019-07-09 ENCOUNTER — HOSPITAL ENCOUNTER (OUTPATIENT)
Dept: PHYSICAL THERAPY | Facility: HOSPITAL | Age: 59
Setting detail: THERAPIES SERIES
Discharge: HOME | End: 2019-07-09

## 2019-07-09 DIAGNOSIS — M54.2 NECK PAIN: Primary | ICD-10-CM

## 2019-07-09 NOTE — THERAPY TREATMENT NOTE
Outpatient Physical Therapy Ortho Treatment Note  [facility]     Patient Name: Nohelia Yo  : 1960  MRN: [id number]  Today's Date: 2019      Visit Date: 2019  Nonbill due to scheduling error on clinician.   Visit Dx:    ICD-10-CM ICD-9-CM   1. Neck pain M54.2 723.1       Patient Active Problem List   Diagnosis   • Encounter to establish care   • Neck pain   • Large breasts   • Encounter for screening mammogram for malignant neoplasm of breast        Past Medical History:   Diagnosis Date   • Abnormal computed tomography scan     - hilar and mediastinal lymphadenopathy      • Abnormal mammography     microcalcifications,left      • Dyspnea    • Extrinsic allergic alveolitis (CMS/HCC)    • Hallux valgus         Past Surgical History:   Procedure Laterality Date   • BREAST BIOPSY  10/19/2009    Biop of L using percut vacuum assisted rotating device & image guidance. Placement of metallic localization clip. Stereotactic location guidance w/ radiologic supervision & interpretation. radiologic exam of surgical specimen Abn L micro calcificat   • COLONOSCOPY N/A 3/27/2018    Procedure: COLONOSCOPY;  Surgeon: Cam Salgado MD;  Location: [facility] ENDOSCOPY;  Service: General   • FOOT SURGERY  2012    1st metatarsal osteotomy,right foot. Metatarsalgia with shortened 1st metatarsal   • METATARSAL OSTEOTOMY  2012    First metatarsal osteotomy, right foot       PT Ortho     Row Name 19 0703       Subjective Comments    Subjective Comments  Noted SCM felt better short term. No significant changes noted  -BB       Precautions and Contraindications    Precautions/Limitations  no known precautions/limitations  -BB       Subjective Pain    Able to rate subjective pain?  yes  -BB    Subjective Pain Comment  mild   -BB       Posture/Observations    Posture/Observations Comments  Significant tone of right SCM. Rounded shoulders in supine   -BB      User Key  (r) = Recorded By,  (t) = Taken By, (c) = Cosigned By    Initials Name Provider Type    Berkley Jeffrey, PT Physical Therapist                      PT Assessment/Plan     Row Name 07/09/19 0705          PT Assessment    Assessment Comments  significant tone noted of right SCM wth strong twitch response with dry needling that improved after. Good mobility of cervical spine felt.   -BB        PT Plan    PT Frequency  1x/week;2x/week  -BB     Predicted Duration of Therapy Intervention (Therapy Eval)  6 weeks  -BB     PT Plan Comments  Possible dry needling on left side pending right side response.   -BB       User Key  (r) = Recorded By, (t) = Taken By, (c) = Cosigned By    Initials Name Provider Type    Berkley Jeffrey, PT Physical Therapist            Exercises     Row Name 07/09/19 0705             Subjective Comments    Subjective Comments  Noted SCM felt better short term. No significant changes noted  -BB         Subjective Pain    Able to rate subjective pain?  yes  -BB      Subjective Pain Comment  mild   -BB         Exercise 1    Exercise Name 1  MWM for cspine for rotation and SB gentle manual distraction   -BB      Time 1  17'  -BB         Exercise 2    Exercise Name 2  Dry needling to SCM and trap on right   -BB      Time 2  12'  -BB      Additional Comments  strong twitches noted of each   -BB        User Key  (r) = Recorded By, (t) = Taken By, (c) = Cosigned By    Initials Name Provider Type    Berkley Jeffrey PT Physical Therapist                       PT OP Goals     Row Name 07/09/19 0705          PT Short Term Goals    STG Date to Achieve  -- defer STG  -BB        Long Term Goals    LTG Date to Achieve  08/08/19  -BB     LTG 1  Independent in HEP   -BB     LTG 2  Decrease frequency of headaches per subjective report   -BB     LTG 3  Decreased tenderness noted of middle traps and SCM   -BB       User Key  (r) = Recorded By, (t) = Taken By, (c) = Cosigned By    Initials Name Provider Type    Berkley Jeffrey, PT  Physical Therapist                         Time Calculation:   Start Time: 0705  Stop Time: 0735  Time Calculation (min): 30 min  Therapy Charges for Today     Code Description Service Date Service Provider Modifiers Qty    16483184691  PT THER SUPP EA 15 MIN 7/9/2019 Berkley Jansen, PT GP 2                    Berkley Jansen, PT  7/9/2019

## 2019-07-16 ENCOUNTER — HOSPITAL ENCOUNTER (OUTPATIENT)
Dept: PHYSICAL THERAPY | Facility: HOSPITAL | Age: 59
Setting detail: THERAPIES SERIES
Discharge: HOME | End: 2019-07-16

## 2019-07-16 DIAGNOSIS — M54.2 NECK PAIN: Primary | ICD-10-CM

## 2019-07-16 PROCEDURE — 97140 MANUAL THERAPY 1/> REGIONS: CPT | Performed by: PHYSICAL THERAPIST

## 2019-07-16 NOTE — THERAPY PROGRESS REPORT/RE-CERT
Outpatient Physical Therapy Ortho Progress Note  [facility]     Patient Name: Nohelia Yo  : 1960  MRN: [id number]  Today's Date: 2019      Visit Date: 2019    Visit Dx:    ICD-10-CM ICD-9-CM   1. Neck pain M54.2 723.1       Patient Active Problem List   Diagnosis   • Encounter to establish care   • Neck pain   • Large breasts   • Encounter for screening mammogram for malignant neoplasm of breast        Past Medical History:   Diagnosis Date   • Abnormal computed tomography scan     - hilar and mediastinal lymphadenopathy      • Abnormal mammography     microcalcifications,left      • Dyspnea    • Extrinsic allergic alveolitis (CMS/HCC)    • Hallux valgus         Past Surgical History:   Procedure Laterality Date   • BREAST BIOPSY  10/19/2009    Biop of L using percut vacuum assisted rotating device & image guidance. Placement of metallic localization clip. Stereotactic location guidance w/ radiologic supervision & interpretation. radiologic exam of surgical specimen Abn L micro calcificat   • COLONOSCOPY N/A 3/27/2018    Procedure: COLONOSCOPY;  Surgeon: Cam Salgado MD;  Location: [facility] ENDOSCOPY;  Service: General   • FOOT SURGERY  2012    1st metatarsal osteotomy,right foot. Metatarsalgia with shortened 1st metatarsal   • METATARSAL OSTEOTOMY  2012    First metatarsal osteotomy, right foot       PT Ortho     Row Name 19 1017       Subjective Comments    Subjective Comments  Reports feeling more relief after intital treatment than the last treatment. Reports hoping to feel better after having some time away from work. Will be unable to attend PT until first week in August.    -BB       Precautions and Contraindications    Precautions/Limitations  no known precautions/limitations  -BB       Subjective Pain    Able to rate subjective pain?  yes  -BB    Subjective Pain Comment  mild   -BB       Posture/Observations    Posture/Observations Comments   Rounded shoulders and forward head posture   -BB       Special Tests/Palpation    Special Tests/Palpation  -- cspine mobility WNL. Hypomild in tspine   -BB       Cervical Palpation    Cervical Palpation- Location?  Levator scapula  -BB    SCM  Bilateral:;Tender;Guarded/taut  -BB    Levator Scapula  Bilateral:;Guarded/taut  -BB    Upper Traps  Bilateral:;Tender;Guarded/taut  -BB    Middle Traps  Bilateral:;Tender;Guarded/taut  -BB       General ROM    GENERAL ROM COMMENTS  c-spine ROM is WFL with tightness   -BB       MMT (Manual Muscle Testing)    General MMT Comments  UE are WFL   -BB       Sensation    Sensation WNL?  WNL  -BB    Light Touch  No apparent deficits  -BB    Additional Comments  reports having CTS related complaints occasionally in hands   -BB      User Key  (r) = Recorded By, (t) = Taken By, (c) = Cosigned By    Initials Name Provider Type    Berkley Jeffrey PT Physical Therapist                      PT Assessment/Plan     Row Name 07/16/19 1017          PT Assessment    Functional Limitations  Limitations in functional capacity and performance  -BB     Impairments  Poor body mechanics;Posture;Pain  -BB     Assessment Comments  Patient present today with limited sessions due to busy work schedule. Patient continues to have tone in the SCM and traps. Patient had strong muscle twitches noted of right SCM and bilateral middle trapezius muscles. Patient has rounding of the thoracic spine with decreased mobility. Patient could continue to benefit from manual therapy to improve tissue and joint mobility.   -BB     Rehab Potential  Good  -BB     Patient/caregiver participated in establishment of treatment plan and goals  Yes  -BB     Patient would benefit from skilled therapy intervention  Yes  -BB        PT Plan    PT Frequency  1x/week;2x/week  -BB     Predicted Duration of Therapy Intervention (Therapy Eval)  6 weeks   -BB     PT Plan Comments  continue with POC as patients schedule allows. Continue  manual therapy   -BB       User Key  (r) = Recorded By, (t) = Taken By, (c) = Cosigned By    Initials Name Provider Type    Berkley Jeffrey PT Physical Therapist          Modalities     Row Name 07/16/19 1017             Ice    Patient reports will apply ice at home to involved area  Yes to go  -BB        User Key  (r) = Recorded By, (t) = Taken By, (c) = Cosigned By    Initials Name Provider Type    Berkley Jeffrey PT Physical Therapist        Exercises     Row Name 07/16/19 1017             Subjective Comments    Subjective Comments  Reports feeling more relief after intital treatment than the last treatment. Reports hoping to feel better after having some time away from work. Will be unable to attend PT until first week in August.    -BB         Subjective Pain    Able to rate subjective pain?  yes  -BB      Subjective Pain Comment  mild   -BB         Exercise 1    Exercise Name 1  dry needling to bilateral MT R>L, and right SCM   -BB      Additional Comments  strong muscle twitches noted through out   -BB         Exercise 2    Exercise Name 2  Recheck    -BB         Exercise 3    Exercise Name 3  PA mobes to tspine in prone   -BB      Time 3  5'   -BB         Exercise 4    Exercise Name 4  ice to go   -BB        User Key  (r) = Recorded By, (t) = Taken By, (c) = Cosigned By    Initials Name Provider Type    Berkley Jeffrey PT Physical Therapist                       PT OP Goals     Row Name 07/16/19 1017          PT Short Term Goals    STG Date to Achieve  -- defer STG  -BB        Long Term Goals    LTG Date to Achieve  08/08/19  -BB     LTG 1  Independent in HEP   -BB     LTG 1 Progress  Ongoing  -BB     LTG 2  Decrease frequency of headaches per subjective report   -BB     LTG 2 Progress  Not Met  -BB     LTG 3  Decreased tenderness noted of middle traps and SCM   -BB     LTG 3 Progress  Not Met  -BB       User Key  (r) = Recorded By, (t) = Taken By, (c) = Cosigned By    Initials Name Provider Type     Berkley Jeffrey, PT Physical Therapist                         Time Calculation:   Start Time: 1020  Stop Time: 1044(Decreased time to obtain supplies )  Time Calculation (min): 24 min  Therapy Charges for Today     Code Description Service Date Service Provider Modifiers Qty    16768276853 HC PT MANUAL THERAPY EA 15 MIN 7/16/2019 Berkley Jansen, PT GP 1    49884947229 HC PT THER SUPP EA 15 MIN 7/16/2019 Berkley Jansen, PT GP 1                    Berkley Jansen, PT  7/16/2019

## 2019-07-17 ENCOUNTER — TELEPHONE (OUTPATIENT)
Dept: FAMILY MEDICINE CLINIC | Facility: CLINIC | Age: 59
End: 2019-07-17

## 2019-07-17 DIAGNOSIS — N62 LARGE BREASTS: ICD-10-CM

## 2019-07-17 DIAGNOSIS — M54.2 NECK PAIN: Primary | ICD-10-CM

## 2019-07-17 NOTE — TELEPHONE ENCOUNTER
Nohelia called and said she wants Yvonne to get her in to see surgeon in [place] she was referred to, I asked name and she said Yvonne knew the name, and she wants the appt with them to be on 08-09-19 because that is the only day she can take off from work to go.

## 2019-07-18 DIAGNOSIS — N62 LARGE BREASTS: Primary | ICD-10-CM

## 2019-08-08 ENCOUNTER — OFFICE VISIT (OUTPATIENT)
Dept: FAMILY MEDICINE CLINIC | Facility: CLINIC | Age: 59
End: 2019-08-08

## 2019-08-08 ENCOUNTER — HOSPITAL ENCOUNTER (OUTPATIENT)
Dept: PHYSICAL THERAPY | Facility: HOSPITAL | Age: 59
Setting detail: THERAPIES SERIES
Discharge: HOME | End: 2019-08-08

## 2019-08-08 ENCOUNTER — APPOINTMENT (OUTPATIENT)
Dept: LAB | Facility: HOSPITAL | Age: 59
End: 2019-08-08

## 2019-08-08 VITALS
HEIGHT: 64 IN | DIASTOLIC BLOOD PRESSURE: 82 MMHG | BODY MASS INDEX: 30.39 KG/M2 | SYSTOLIC BLOOD PRESSURE: 140 MMHG | WEIGHT: 178 LBS

## 2019-08-08 DIAGNOSIS — M50.30 DDD (DEGENERATIVE DISC DISEASE), CERVICAL: ICD-10-CM

## 2019-08-08 DIAGNOSIS — M54.2 NECK PAIN: Primary | ICD-10-CM

## 2019-08-08 DIAGNOSIS — N62 LARGE BREASTS: Primary | ICD-10-CM

## 2019-08-08 DIAGNOSIS — M54.2 NECK PAIN: ICD-10-CM

## 2019-08-08 LAB
25(OH)D3 SERPL-MCNC: 38.6 NG/ML (ref 30–100)
ALBUMIN SERPL-MCNC: 4.7 G/DL (ref 3.5–5.2)
ALBUMIN/GLOB SERPL: 2 G/DL
ALP SERPL-CCNC: 59 U/L (ref 39–117)
ALT SERPL W P-5'-P-CCNC: 36 U/L (ref 1–33)
ANION GAP SERPL CALCULATED.3IONS-SCNC: 15.1 MMOL/L (ref 5–15)
AST SERPL-CCNC: 32 U/L (ref 1–32)
BASOPHILS # BLD AUTO: 0.07 10*3/MM3 (ref 0–0.2)
BASOPHILS NFR BLD AUTO: 0.8 % (ref 0–1.5)
BILIRUB SERPL-MCNC: 0.7 MG/DL (ref 0.2–1.2)
BUN BLD-MCNC: 17 MG/DL (ref 6–20)
BUN/CREAT SERPL: 23.6 (ref 7–25)
CALCIUM SPEC-SCNC: 9.5 MG/DL (ref 8.6–10.5)
CHLORIDE SERPL-SCNC: 101 MMOL/L (ref 98–107)
CHOLEST SERPL-MCNC: 198 MG/DL (ref 0–200)
CO2 SERPL-SCNC: 22.9 MMOL/L (ref 22–29)
CREAT BLD-MCNC: 0.72 MG/DL (ref 0.57–1)
DEPRECATED RDW RBC AUTO: 38.1 FL (ref 37–54)
EOSINOPHIL # BLD AUTO: 0.14 10*3/MM3 (ref 0–0.4)
EOSINOPHIL NFR BLD AUTO: 1.6 % (ref 0.3–6.2)
ERYTHROCYTE [DISTWIDTH] IN BLOOD BY AUTOMATED COUNT: 11.9 % (ref 12.3–15.4)
GFR SERPL CREATININE-BSD FRML MDRD: 83 ML/MIN/1.73
GLOBULIN UR ELPH-MCNC: 2.4 GM/DL
GLUCOSE BLD-MCNC: 107 MG/DL (ref 65–99)
HCT VFR BLD AUTO: 40 % (ref 34–46.6)
HCV AB SER DONR QL: NORMAL
HDLC SERPL-MCNC: 53 MG/DL (ref 40–60)
HGB BLD-MCNC: 13.4 G/DL (ref 12–15.9)
IMM GRANULOCYTES # BLD AUTO: 0.02 10*3/MM3 (ref 0–0.05)
IMM GRANULOCYTES NFR BLD AUTO: 0.2 % (ref 0–0.5)
IRON 24H UR-MRATE: 134 MCG/DL (ref 37–145)
LDLC SERPL CALC-MCNC: 121 MG/DL (ref 0–100)
LDLC/HDLC SERPL: 2.29 {RATIO}
LYMPHOCYTES # BLD AUTO: 2.68 10*3/MM3 (ref 0.7–3.1)
LYMPHOCYTES NFR BLD AUTO: 30.8 % (ref 19.6–45.3)
MAGNESIUM SERPL-MCNC: 2.2 MG/DL (ref 1.6–2.6)
MCH RBC QN AUTO: 29.4 PG (ref 26.6–33)
MCHC RBC AUTO-ENTMCNC: 33.5 G/DL (ref 31.5–35.7)
MCV RBC AUTO: 87.7 FL (ref 79–97)
MONOCYTES # BLD AUTO: 0.53 10*3/MM3 (ref 0.1–0.9)
MONOCYTES NFR BLD AUTO: 6.1 % (ref 5–12)
NEUTROPHILS # BLD AUTO: 5.25 10*3/MM3 (ref 1.7–7)
NEUTROPHILS NFR BLD AUTO: 60.5 % (ref 42.7–76)
NRBC BLD AUTO-RTO: 0 /100 WBC (ref 0–0.2)
PLATELET # BLD AUTO: 205 10*3/MM3 (ref 140–450)
PMV BLD AUTO: 11.7 FL (ref 6–12)
POTASSIUM BLD-SCNC: 3.8 MMOL/L (ref 3.5–5.2)
PROT SERPL-MCNC: 7.1 G/DL (ref 6–8.5)
RBC # BLD AUTO: 4.56 10*6/MM3 (ref 3.77–5.28)
SODIUM BLD-SCNC: 139 MMOL/L (ref 136–145)
TRIGL SERPL-MCNC: 118 MG/DL (ref 0–150)
TSH SERPL DL<=0.05 MIU/L-ACNC: 2.28 MIU/ML (ref 0.27–4.2)
VIT B12 BLD-MCNC: 518 PG/ML (ref 211–946)
VLDLC SERPL-MCNC: 23.6 MG/DL (ref 5–40)
WBC NRBC COR # BLD: 8.69 10*3/MM3 (ref 3.4–10.8)

## 2019-08-08 PROCEDURE — 80061 LIPID PANEL: CPT | Performed by: NURSE PRACTITIONER

## 2019-08-08 PROCEDURE — 99213 OFFICE O/P EST LOW 20 MIN: CPT | Performed by: NURSE PRACTITIONER

## 2019-08-08 PROCEDURE — 85025 COMPLETE CBC W/AUTO DIFF WBC: CPT | Performed by: NURSE PRACTITIONER

## 2019-08-08 PROCEDURE — 83540 ASSAY OF IRON: CPT | Performed by: NURSE PRACTITIONER

## 2019-08-08 PROCEDURE — 82306 VITAMIN D 25 HYDROXY: CPT | Performed by: NURSE PRACTITIONER

## 2019-08-08 PROCEDURE — 83735 ASSAY OF MAGNESIUM: CPT | Performed by: NURSE PRACTITIONER

## 2019-08-08 PROCEDURE — 80053 COMPREHEN METABOLIC PANEL: CPT | Performed by: NURSE PRACTITIONER

## 2019-08-08 PROCEDURE — 84443 ASSAY THYROID STIM HORMONE: CPT | Performed by: NURSE PRACTITIONER

## 2019-08-08 PROCEDURE — 36415 COLL VENOUS BLD VENIPUNCTURE: CPT | Performed by: NURSE PRACTITIONER

## 2019-08-08 PROCEDURE — 82607 VITAMIN B-12: CPT | Performed by: NURSE PRACTITIONER

## 2019-08-08 PROCEDURE — 86803 HEPATITIS C AB TEST: CPT | Performed by: NURSE PRACTITIONER

## 2019-08-08 NOTE — THERAPY PROGRESS REPORT/RE-CERT
Outpatient Physical Therapy Ortho Progress Note  [facility]     Patient Name: Nohelia oY  : 1960  MRN: [id number]  Today's Date: 2019      Visit Date: 2019    Visit Dx:    ICD-10-CM ICD-9-CM   1. Neck pain M54.2 723.1       Patient Active Problem List   Diagnosis   • Encounter to establish care   • Neck pain   • Large breasts   • Encounter for screening mammogram for malignant neoplasm of breast   • DDD (degenerative disc disease), cervical        Past Medical History:   Diagnosis Date   • Abnormal computed tomography scan     - hilar and mediastinal lymphadenopathy      • Abnormal mammography     microcalcifications,left      • Dyspnea    • Extrinsic allergic alveolitis (CMS/HCC)    • Hallux valgus         Past Surgical History:   Procedure Laterality Date   • BREAST BIOPSY  10/19/2009    Biop of L using percut vacuum assisted rotating device & image guidance. Placement of metallic localization clip. Stereotactic location guidance w/ radiologic supervision & interpretation. radiologic exam of surgical specimen Abn L micro calcificat   • COLONOSCOPY N/A 3/27/2018    Procedure: COLONOSCOPY;  Surgeon: Cam Salgado MD;  Location: [facility] ENDOSCOPY;  Service: General   • FOOT SURGERY  2012    1st metatarsal osteotomy,right foot. Metatarsalgia with shortened 1st metatarsal   • METATARSAL OSTEOTOMY  2012    First metatarsal osteotomy, right foot       PT Ortho     Row Name 19 0714       Subjective Comments    Subjective Comments  Reports continued pain while on trip without working. Reports being able to rest with continued issues and is unable to correlate issues to behavior. Reports dry needling provides short term relief but issues return with normal routines.   -BB       Precautions and Contraindications    Precautions/Limitations  no known precautions/limitations  -BB       Subjective Pain    Subjective Pain Comment  Pain level not noted today. Felt  "\"sore\" after session   -BB       Posture/Observations    Posture/Observations Comments  Significant rounding shoulders seen in sitting and supine with significant breast tissue present.   -BB       Special Tests/Palpation    Special Tests/Palpation  Cervical/Thoracic  -BB       Cervical Palpation    SCM  Bilateral:;Tender;Guarded/taut R>L  -BB    Levator Scapula  Bilateral:;Guarded/taut;Tender R>L  -BB    Middle Traps  Bilateral:;Tender;Guarded/taut R>L  -BB       General ROM    GENERAL ROM COMMENTS  AROM of cervical spine is WFL  with tightness felt.   -BB       MMT (Manual Muscle Testing)    General MMT Comments  UEs are WFL with no deficits   -BB       Sensation    Sensation WNL?  WNL  -BB    Light Touch  No apparent deficits  -BB      User Key  (r) = Recorded By, (t) = Taken By, (c) = Cosigned By    Initials Name Provider Type    Berkley Jeffrey, PT Physical Therapist                      PT Assessment/Plan     Row Name 08/08/19 0745          PT Assessment    Functional Limitations  Limitations in functional capacity and performance  -BB     Impairments  Poor body mechanics;Posture;Pain  -BB     Assessment Comments  Patient presents today after several weeks break from work and with increased relaxation with continued tightness and tone of the postural muscles. Patient notes improved pain when laying in supine. Patients large breast tissue is increasing the amount of pull and strain on the posterior postural musculature in turn leading to increased pain as well as tightness of the anterior muscles such as pectoralis and SCMs. Patient was also educated on postural awareness and given cues on possible modifications. Patient had significant strong muscle twitches today in the levator scapula and middle trapezius musculature with improved tone felt after. Patient could possibly continue to benefit from dry needling to improve muscle tension but anticipate the relief to be temporary due to forementioned deficits.   " "-BB     Rehab Potential  Fair  -BB     Patient/caregiver participated in establishment of treatment plan and goals  Yes  -BB     Patient would benefit from skilled therapy intervention  Yes  -BB        PT Plan    PT Frequency  1x/week;2x/week  -BB     Predicted Duration of Therapy Intervention (Therapy Eval)  1-3 weeks pending response  -BB     PT Plan Comments  Will continue to dry needle to improve muscle tension with patients independence in stretching HEP   -BB       User Key  (r) = Recorded By, (t) = Taken By, (c) = Cosigned By    Initials Name Provider Type    Berkley Jeffrey, PT Physical Therapist            Exercises     Row Name 08/08/19 0764             Subjective Comments    Subjective Comments  Reports continued pain while on trip without working. Reports being able to rest with continued issues and is unable to correlate issues to behavior. Reports dry needling provides short term relief but issues return with normal routines.   -BB         Subjective Pain    Subjective Pain Comment  Pain level not noted today. Felt \"sore\" after session   -BB         Exercise 1    Exercise Name 1  dry needling to middle trap, LS, and right SCM   -BB      Additional Comments  strong twitches in all muscles noted   -BB         Exercise 2    Exercise Name 2  recheck   -BB      Additional Comments  HEP: doorway stretch, and reviewed   -BB        User Key  (r) = Recorded By, (t) = Taken By, (c) = Cosigned By    Initials Name Provider Type    Berkley Jeffrey, PT Physical Therapist                       PT OP Goals     Row Name 08/08/19 3776          PT Short Term Goals    STG Date to Achieve  -- defer STG  -BB        Long Term Goals    LTG Date to Achieve  08/08/19  -BB     LTG 1  Independent in HEP   -BB     LTG 1 Progress  Met  -BB     LTG 2  Decrease frequency of headaches per subjective report   -BB     LTG 2 Progress  Not Met  -BB     LTG 3  Decreased tenderness noted of middle traps and SCM   -BB     LTG 3 Progress  " Not Met  -ASYA        Time Calculation    PT Goal Re-Cert Due Date  08/29/19  -BB       User Key  (r) = Recorded By, (t) = Taken By, (c) = Cosigned By    Initials Name Provider Type    Berkley Jeffrey PT Physical Therapist          Therapy Education  Education Details: postural awareness, doorway stretch   Given: HEP  Program: New  How Provided: Verbal  Provided to: Patient  Level of Understanding: Verbalized              Time Calculation:   Start Time: 0745  Stop Time: 0822  Time Calculation (min): 37 min  Therapy Charges for Today     Code Description Service Date Service Provider Modifiers Qty    85180033796 HC PT SELF PAY DRY NEEDLING SESSION 8/8/2019 Berkley Jansen, PT  1    62502917660 HC PT THER SUPP EA 15 MIN 8/8/2019 Berkley Jansen, PT GP 1                    Berkley Jansen, PT  8/8/2019

## 2019-08-08 NOTE — PROGRESS NOTES
Chief Complaint   Patient presents with   • Follow-up     before seeing Surgeon in [facility]   Nohelia Yo is a 59 y.o. female.     Presents with recheck after PT -patient reports neck pain not improved and actually feels more tight now than before. Reports indentations in shoulders worsening -no weight changes-has been very active over the past 3 weeks.       Neck Pain    This is a recurrent problem. The current episode started more than 1 year ago. The problem occurs daily. The problem has been gradually worsening. The pain is associated with nothing. The pain is present in the anterior neck. The quality of the pain is described as aching, cramping, shooting and stabbing. The pain is at a severity of 9/10. The pain is severe. The symptoms are aggravated by bending (large breast ). The pain is same all the time. Stiffness is present all day. Associated symptoms include headaches. Pertinent negatives include no chest pain, fever, leg pain, numbness, pain with swallowing, paresis, photophobia, syncope, tingling, trouble swallowing, visual change or weakness. She has tried acetaminophen, heat, home exercises, neck support, muscle relaxants, NSAIDs, bed rest and ice (finished PT-see report ) for the symptoms. The treatment provided mild relief.   Breast Problem   This is a chronic problem. The current episode started more than 1 year ago. The problem occurs daily. The problem has been gradually worsening. Associated symptoms include arthralgias, headaches, joint swelling and neck pain. Pertinent negatives include no abdominal pain, change in bowel habit, chest pain, chills, congestion, coughing, diaphoresis, fatigue, fever, myalgias, nausea, numbness, rash, sore throat, swollen glands, urinary symptoms, vertigo, visual change, vomiting or weakness. Treatments tried: PT and nsaids, change in bra- The treatment provided no relief.        The following portions of the patient's history were  reviewed and updated as appropriate: allergies, current medications, past social history and problem list.    Review of Systems   Constitutional: Negative.  Negative for activity change, appetite change, chills, diaphoresis, fatigue, fever and unexpected weight change.   HENT: Negative for congestion, dental problem, drooling, ear discharge, ear pain, facial swelling, hearing loss, mouth sores, nosebleeds, postnasal drip, rhinorrhea, sinus pressure, sinus pain, sneezing, sore throat, tinnitus, trouble swallowing and voice change.    Eyes: Negative.  Negative for photophobia, pain, discharge, redness, itching and visual disturbance.   Respiratory: Negative.  Negative for apnea, cough, choking, chest tightness, shortness of breath, wheezing and stridor.    Cardiovascular: Negative.  Negative for chest pain, palpitations, leg swelling and syncope.   Gastrointestinal: Negative.  Negative for abdominal distention, abdominal pain, anal bleeding, blood in stool, change in bowel habit, constipation, diarrhea, nausea, rectal pain and vomiting.   Endocrine: Negative.  Negative for cold intolerance, heat intolerance, polydipsia, polyphagia and polyuria.   Genitourinary: Negative.  Negative for decreased urine volume, difficulty urinating, dyspareunia, dysuria, enuresis, flank pain, frequency, genital sores, hematuria, menstrual problem, pelvic pain, urgency, vaginal bleeding, vaginal discharge and vaginal pain.   Musculoskeletal: Positive for arthralgias, back pain, gait problem, joint swelling, neck pain and neck stiffness. Negative for myalgias.        Neck pain and headaches    Skin: Negative.  Negative for color change, pallor, rash and wound.   Allergic/Immunologic: Negative.  Negative for environmental allergies, food allergies and immunocompromised state.   Neurological: Positive for headaches. Negative for dizziness, vertigo, tingling, tremors, seizures, syncope, facial asymmetry, speech difficulty, weakness,  "light-headedness and numbness.   Hematological: Negative.  Negative for adenopathy. Does not bruise/bleed easily.   Psychiatric/Behavioral: Negative.  Negative for agitation, behavioral problems, confusion, decreased concentration, dysphoric mood, hallucinations, self-injury, sleep disturbance and suicidal ideas. The patient is not nervous/anxious and is not hyperactive.        Objective   /82   Ht 162.6 cm (64\")   Wt 80.7 kg (178 lb)   BMI 30.55 kg/m²   Physical Exam   Constitutional: She is oriented to person, place, and time. She appears well-developed and well-nourished. No distress.   HENT:   Head: Normocephalic and atraumatic.   Right Ear: External ear normal.   Left Ear: External ear normal.   Nose: Nose normal.   Mouth/Throat: Oropharynx is clear and moist. No oropharyngeal exudate.   Eyes: Conjunctivae and EOM are normal. Pupils are equal, round, and reactive to light. Right eye exhibits no discharge. Left eye exhibits no discharge. No scleral icterus.   Neck: Normal range of motion. Neck supple. No JVD present. No tracheal deviation present. No thyromegaly present.   Cardiovascular: Normal rate, regular rhythm, normal heart sounds and intact distal pulses. Exam reveals no gallop and no friction rub.   No murmur heard.  Pulmonary/Chest: Effort normal and breath sounds normal. No stridor. No respiratory distress. She has no wheezes. She has no rales. She exhibits no tenderness.   Abdominal: Soft. Bowel sounds are normal. She exhibits no distension and no mass. There is no tenderness. There is no rebound and no guarding. No hernia.   Musculoskeletal: Normal range of motion. She exhibits tenderness. She exhibits no edema or deformity.        Cervical back: She exhibits pain and spasm.        Back:    Indentations both shoulders from bra-large breast size-for body size    Lymphadenopathy:     She has no cervical adenopathy.   Neurological: She is alert and oriented to person, place, and time. She " displays normal reflexes. No cranial nerve deficit or sensory deficit. She exhibits normal muscle tone. Coordination normal.   Skin: Skin is warm and dry. No rash noted. She is not diaphoretic. No erythema. No pallor.   Psychiatric: She has a normal mood and affect. Her behavior is normal. Judgment and thought content normal.   Nursing note and vitals reviewed.    XR Spine Cervical 2 or 3 View [[id number]] (Order [id number])   Order   Status: Final result   Appointment Information     Display Notes     EST DR.OK EMI CLARK           PACS Images      Radiology Images   Study Result        PROCEDURE: Three view cervical spine     COMPARISON: No comparison     HISTORY: Cervicalgia     FINDINGS:   AP, lateral and open mouth odontoid views are obtained of the  cervical spine.   The odontoid process is intact. The lateral masses of C1  demonstrate normal anatomic alignment and symmetry about the  dens.   There is disc space narrowing at C5-C6.  The cervical vertebra demonstrate normal vertebral body heights,  alignment and otherwise normal intervertebral disc spacing.  There is no evidence of acute fracture or subluxation.   The prevertebral soft tissues are within normal limits.     IMPRESSION:  CONCLUSION:    Degenerative changes at C5-C6. Otherwise unremarkable exam.     Electronically signed by:  Varghese Dahl MD  6/20/2019 10:38 AM  CDT Workstation: [id number]   Imaging     XR Spine Cervical 2 or 3 View (Order: [id number]) - 6/20/2019   Reprint Order Requisition     XR SPINE CERVICAL 2 OR 3 VW (Order #[id number]) on 6/20/19   Signed by     Signed Date/Time  Phone Pager   VARGHESE DAHL 6/20/2019 10:38 [phone]    Exam Information     Status Exam Begun  Exam Ended    Final [99] 6/20/2019 10:09 6/20/2019 10:18   Questions      Reason for Exam: neck pain      Reviewed by     Yvonne Trevizo, DARBY 6/20/2019 12:17   External Results Report     Open External Results Report    Encounter     View Encounter          Order-Level  Documents:     There are no order-level documents.   Encounter-Level Documents:     There are no encounter-level documents.   Implants     None   Patient Care Timeline     No data selected in time range   Reason For Exam   Priority: STAT   neck pain   Dx: Neck pain [M54.2 (ICD-10-CM)]; Large breasts [N62 (ICD-10-CM)]   Results Routing Tracking     View Results Routing Information   Order Report     Order Details     Assessment/Plan   Problem List Items Addressed This Visit        Nervous and Auditory    Neck pain       Musculoskeletal and Integument    DDD (degenerative disc disease), cervical       Other    Large breasts - Primary         No orders of the defined types were placed in this encounter.      It's not just what you eat, but when you eat  Eat breakfast, and eat smaller meals throughout the day. A healthy breakfast can jumpstart your metabolism, while eating small, healthy meals (rather than the standard three large meals) keeps your energy up.   Avoid eating at night. Try to eat dinner earlier and fast for 14-16 hours until breakfast the next morning. Studies suggest that eating only when you’re most active and giving your digestive system a long break each day may help to regulate weight.     Patient has an appointment with plastic surgeon in the am_ I support her need for breast reduction due to worsening neck pain and no improvement with PT. Labs today

## 2019-08-12 ENCOUNTER — HOSPITAL ENCOUNTER (OUTPATIENT)
Dept: PHYSICAL THERAPY | Facility: HOSPITAL | Age: 59
Setting detail: THERAPIES SERIES
Discharge: HOME | End: 2019-08-12

## 2019-08-12 DIAGNOSIS — M54.2 NECK PAIN: Primary | ICD-10-CM

## 2019-08-12 NOTE — THERAPY TREATMENT NOTE
"    Outpatient Physical Therapy Ortho Treatment Note  [facility]     Patient Name: Nohelia Yo  : 1960  MRN: [id number]  Today's Date: 2019      Visit Date: 2019    Visit Dx:    ICD-10-CM ICD-9-CM   1. Neck pain M54.2 723.1       Patient Active Problem List   Diagnosis   • Encounter to establish care   • Neck pain   • Large breasts   • Encounter for screening mammogram for malignant neoplasm of breast   • DDD (degenerative disc disease), cervical        Past Medical History:   Diagnosis Date   • Abnormal computed tomography scan     - hilar and mediastinal lymphadenopathy      • Abnormal mammography     microcalcifications,left      • Dyspnea    • Extrinsic allergic alveolitis (CMS/HCC)    • Hallux valgus         Past Surgical History:   Procedure Laterality Date   • BREAST BIOPSY  10/19/2009    Biop of L using percut vacuum assisted rotating device & image guidance. Placement of metallic localization clip. Stereotactic location guidance w/ radiologic supervision & interpretation. radiologic exam of surgical specimen Abn L micro calcificat   • COLONOSCOPY N/A 3/27/2018    Procedure: COLONOSCOPY;  Surgeon: Cam Salgado MD;  Location: [facility] ENDOSCOPY;  Service: General   • FOOT SURGERY  2012    1st metatarsal osteotomy,right foot. Metatarsalgia with shortened 1st metatarsal   • METATARSAL OSTEOTOMY  2012    First metatarsal osteotomy, right foot       PT Ortho     Row Name 19 0777       Subjective Comments    Subjective Comments  Notes having a headache today. Usually feels sore after sessions but feels a little looser. Notes thinking overall neck is more loose than used to be   -BB       Precautions and Contraindications    Precautions/Limitations  no known precautions/limitations  -BB       Subjective Pain    Able to rate subjective pain?  yes  -BB    Subjective Pain Comment  \"tight\"  -BB       Posture/Observations    Posture/Observations Comments  " "Decreased tone noted of right trap but increased tone of right SCM noted.   -BB      User Key  (r) = Recorded By, (t) = Taken By, (c) = Cosigned By    Initials Name Provider Type    Berkley Jeffrey PT Physical Therapist                      PT Assessment/Plan     Row Name 08/12/19 1150          PT Assessment    Assessment Comments  Tone in right SCM limited ability to maintain muscle position with weak muscle twitches felt. Strong twitches noted of right middle traps with improved tone noted after. Patient noted headache to be more right sided after dry needling.   -BB        PT Plan    PT Frequency  1x/week;2x/week  -BB     Predicted Duration of Therapy Intervention (Therapy Eval)  1-3 weeks  -BB     PT Plan Comments  Possible progress in tone noted with quick sessions.   -BB       User Key  (r) = Recorded By, (t) = Taken By, (c) = Cosigned By    Initials Name Provider Type    Berkley Jeffrey PT Physical Therapist            Exercises     Row Name 08/12/19 1150             Subjective Comments    Subjective Comments  Notes having a headache today. Usually feels sore after sessions but feels a little looser. Notes thinking overall neck is more loose than used to be   -BB         Subjective Pain    Able to rate subjective pain?  yes  -BB      Subjective Pain Comment  \"tight\"  -BB         Exercise 1    Exercise Name 1  dry needling to rigth trap   -BB      Additional Comments  strong twitches noted   -BB         Exercise 2    Exercise Name 2  Attempted dry needling to right SCM   -BB      Additional Comments  Tone limited ability to maintain muscle position   -BB         Exercise 3    Exercise Name 3  ice to go   -BB        User Key  (r) = Recorded By, (t) = Taken By, (c) = Cosigned By    Initials Name Provider Type    Berkley Jeffrey PT Physical Therapist                       PT OP Goals     Row Name 08/12/19 1150          PT Short Term Goals    STG Date to Achieve  -- defer STG  -BB        Long Term Goals "    LTG Date to Achieve  08/08/19  -BB     LTG 1  Independent in HEP   -BB     LTG 1 Progress  Met  -BB     LTG 2  Decrease frequency of headaches per subjective report   -BB     LTG 2 Progress  Not Met  -BB     LTG 3  Decreased tenderness noted of middle traps and SCM   -BB     LTG 3 Progress  Not Met  -BB        Time Calculation    PT Goal Re-Cert Due Date  08/29/19  -BB       User Key  (r) = Recorded By, (t) = Taken By, (c) = Cosigned By    Initials Name Provider Type    Berkley Jeffrey, PT Physical Therapist                         Time Calculation:   Start Time: 1150  Stop Time: 1215  Time Calculation (min): 25 min  PT Non-Billable Time (min): 5 min  Therapy Charges for Today     Code Description Service Date Service Provider Modifiers Qty    45051153022 HC PT SELF PAY DRY NEEDLING SESSION 8/12/2019 Berkley Jansen, PT  1                    Berkley Jansen, PT  8/12/2019

## 2020-03-03 ENCOUNTER — TELEPHONE (OUTPATIENT)
Dept: FAMILY MEDICINE CLINIC | Facility: CLINIC | Age: 60
End: 2020-03-03

## 2020-03-03 RX ORDER — ACETAZOLAMIDE 250 MG/1
250 TABLET ORAL 2 TIMES DAILY
Qty: 60 TABLET | Refills: 1 | Status: SHIPPED | OUTPATIENT
Start: 2020-03-03 | End: 2020-12-14

## 2020-03-03 NOTE — TELEPHONE ENCOUNTER
Patient called and requested Yvonne order her Diamox for high altitude to [facility].

## 2020-08-10 ENCOUNTER — TELEPHONE (OUTPATIENT)
Dept: FAMILY MEDICINE CLINIC | Facility: CLINIC | Age: 60
End: 2020-08-10

## 2020-12-09 ENCOUNTER — HOSPITAL ENCOUNTER (EMERGENCY)
Facility: HOSPITAL | Age: 60
Discharge: HOME | End: 2020-12-10
Attending: EMERGENCY MEDICINE

## 2020-12-09 ENCOUNTER — APPOINTMENT (OUTPATIENT)
Dept: CT IMAGING | Facility: HOSPITAL | Age: 60
End: 2020-12-09

## 2020-12-09 DIAGNOSIS — N20.1 URETEROLITHIASIS: Primary | ICD-10-CM

## 2020-12-09 LAB
ALBUMIN SERPL-MCNC: 4.6 G/DL (ref 3.5–5.2)
ALBUMIN/GLOB SERPL: 1.6 G/DL
ALP SERPL-CCNC: 75 U/L (ref 39–117)
ALT SERPL W P-5'-P-CCNC: 28 U/L (ref 1–33)
ANION GAP SERPL CALCULATED.3IONS-SCNC: 10 MMOL/L (ref 5–15)
AST SERPL-CCNC: 33 U/L (ref 1–32)
BASOPHILS # BLD AUTO: 0.05 10*3/MM3 (ref 0–0.2)
BASOPHILS NFR BLD AUTO: 0.4 % (ref 0–1.5)
BILIRUB SERPL-MCNC: 0.6 MG/DL (ref 0–1.2)
BUN SERPL-MCNC: 16 MG/DL (ref 6–20)
BUN/CREAT SERPL: 13.3 (ref 7–25)
CALCIUM SPEC-SCNC: 9.7 MG/DL (ref 8.6–10.5)
CHLORIDE SERPL-SCNC: 102 MMOL/L (ref 98–107)
CO2 SERPL-SCNC: 24 MMOL/L (ref 22–29)
CREAT SERPL-MCNC: 1.2 MG/DL (ref 0.57–1)
DEPRECATED RDW RBC AUTO: 35.3 FL (ref 37–54)
EOSINOPHIL # BLD AUTO: 0.01 10*3/MM3 (ref 0–0.4)
EOSINOPHIL NFR BLD AUTO: 0.1 % (ref 0.3–6.2)
ERYTHROCYTE [DISTWIDTH] IN BLOOD BY AUTOMATED COUNT: 11.4 % (ref 12.3–15.4)
GFR SERPL CREATININE-BSD FRML MDRD: 46 ML/MIN/1.73
GLOBULIN UR ELPH-MCNC: 2.9 GM/DL
GLUCOSE SERPL-MCNC: 159 MG/DL (ref 65–99)
HCT VFR BLD AUTO: 42.5 % (ref 34–46.6)
HGB BLD-MCNC: 14.9 G/DL (ref 12–15.9)
IMM GRANULOCYTES # BLD AUTO: 0.02 10*3/MM3 (ref 0–0.05)
IMM GRANULOCYTES NFR BLD AUTO: 0.2 % (ref 0–0.5)
LIPASE SERPL-CCNC: 21 U/L (ref 13–60)
LYMPHOCYTES # BLD AUTO: 1.13 10*3/MM3 (ref 0.7–3.1)
LYMPHOCYTES NFR BLD AUTO: 9.6 % (ref 19.6–45.3)
MCH RBC QN AUTO: 30.1 PG (ref 26.6–33)
MCHC RBC AUTO-ENTMCNC: 35.1 G/DL (ref 31.5–35.7)
MCV RBC AUTO: 85.9 FL (ref 79–97)
MONOCYTES # BLD AUTO: 0.51 10*3/MM3 (ref 0.1–0.9)
MONOCYTES NFR BLD AUTO: 4.3 % (ref 5–12)
NEUTROPHILS NFR BLD AUTO: 10.01 10*3/MM3 (ref 1.7–7)
NEUTROPHILS NFR BLD AUTO: 85.4 % (ref 42.7–76)
NRBC BLD AUTO-RTO: 0 /100 WBC (ref 0–0.2)
PLATELET # BLD AUTO: 230 10*3/MM3 (ref 140–450)
PMV BLD AUTO: 10.2 FL (ref 6–12)
POTASSIUM SERPL-SCNC: 4.1 MMOL/L (ref 3.5–5.2)
PROT SERPL-MCNC: 7.5 G/DL (ref 6–8.5)
RBC # BLD AUTO: 4.95 10*6/MM3 (ref 3.77–5.28)
SODIUM SERPL-SCNC: 136 MMOL/L (ref 136–145)
WBC # BLD AUTO: 11.73 10*3/MM3 (ref 3.4–10.8)

## 2020-12-09 PROCEDURE — 25010000002 HYDROMORPHONE 1 MG/ML SOLUTION: Performed by: EMERGENCY MEDICINE

## 2020-12-09 PROCEDURE — 83690 ASSAY OF LIPASE: CPT | Performed by: EMERGENCY MEDICINE

## 2020-12-09 PROCEDURE — 74177 CT ABD & PELVIS W/CONTRAST: CPT

## 2020-12-09 PROCEDURE — 96374 THER/PROPH/DIAG INJ IV PUSH: CPT

## 2020-12-09 PROCEDURE — 25010000002 IOPAMIDOL 61 % SOLUTION: Performed by: EMERGENCY MEDICINE

## 2020-12-09 PROCEDURE — 25010000002 ONDANSETRON PER 1 MG: Performed by: EMERGENCY MEDICINE

## 2020-12-09 PROCEDURE — 80053 COMPREHEN METABOLIC PANEL: CPT | Performed by: EMERGENCY MEDICINE

## 2020-12-09 PROCEDURE — 99285 EMERGENCY DEPT VISIT HI MDM: CPT

## 2020-12-09 PROCEDURE — 85025 COMPLETE CBC W/AUTO DIFF WBC: CPT | Performed by: EMERGENCY MEDICINE

## 2020-12-09 PROCEDURE — 96375 TX/PRO/DX INJ NEW DRUG ADDON: CPT

## 2020-12-09 RX ORDER — LABETALOL HYDROCHLORIDE 5 MG/ML
20 INJECTION, SOLUTION INTRAVENOUS ONCE
Status: DISCONTINUED | OUTPATIENT
Start: 2020-12-09 | End: 2020-12-10 | Stop reason: HOSPADM

## 2020-12-09 RX ORDER — ONDANSETRON 2 MG/ML
4 INJECTION INTRAMUSCULAR; INTRAVENOUS ONCE
Status: COMPLETED | OUTPATIENT
Start: 2020-12-10 | End: 2020-12-10

## 2020-12-09 RX ORDER — ONDANSETRON 2 MG/ML
4 INJECTION INTRAMUSCULAR; INTRAVENOUS ONCE
Status: COMPLETED | OUTPATIENT
Start: 2020-12-09 | End: 2020-12-09

## 2020-12-09 RX ORDER — SODIUM CHLORIDE 0.9 % (FLUSH) 0.9 %
10 SYRINGE (ML) INJECTION AS NEEDED
Status: DISCONTINUED | OUTPATIENT
Start: 2020-12-09 | End: 2020-12-10 | Stop reason: HOSPADM

## 2020-12-09 RX ADMIN — IOPAMIDOL 90 ML: 612 INJECTION, SOLUTION INTRAVENOUS at 23:53

## 2020-12-09 RX ADMIN — HYDROMORPHONE HYDROCHLORIDE 0.5 MG: 1 INJECTION, SOLUTION INTRAMUSCULAR; INTRAVENOUS; SUBCUTANEOUS at 22:59

## 2020-12-09 RX ADMIN — ONDANSETRON HYDROCHLORIDE 4 MG: 2 SOLUTION INTRAMUSCULAR; INTRAVENOUS at 22:59

## 2020-12-10 ENCOUNTER — APPOINTMENT (OUTPATIENT)
Dept: GENERAL RADIOLOGY | Facility: HOSPITAL | Age: 60
End: 2020-12-10

## 2020-12-10 ENCOUNTER — HOSPITAL ENCOUNTER (OUTPATIENT)
Facility: HOSPITAL | Age: 60
Setting detail: HOSPITAL OUTPATIENT SURGERY
Discharge: HOME | End: 2020-12-10
Attending: UROLOGY | Admitting: UROLOGY

## 2020-12-10 ENCOUNTER — ANESTHESIA EVENT (OUTPATIENT)
Dept: PERIOP | Facility: HOSPITAL | Age: 60
End: 2020-12-10

## 2020-12-10 ENCOUNTER — PREP FOR SURGERY (OUTPATIENT)
Dept: OTHER | Facility: HOSPITAL | Age: 60
End: 2020-12-10

## 2020-12-10 ENCOUNTER — ANESTHESIA (OUTPATIENT)
Dept: PERIOP | Facility: HOSPITAL | Age: 60
End: 2020-12-10

## 2020-12-10 VITALS
HEIGHT: 65 IN | DIASTOLIC BLOOD PRESSURE: 80 MMHG | WEIGHT: 194.3 LBS | BODY MASS INDEX: 32.37 KG/M2 | HEART RATE: 71 BPM | RESPIRATION RATE: 18 BRPM | SYSTOLIC BLOOD PRESSURE: 177 MMHG | OXYGEN SATURATION: 98 % | TEMPERATURE: 97.4 F

## 2020-12-10 VITALS
RESPIRATION RATE: 18 BRPM | WEIGHT: 176.37 LBS | BODY MASS INDEX: 29.38 KG/M2 | SYSTOLIC BLOOD PRESSURE: 125 MMHG | OXYGEN SATURATION: 97 % | HEIGHT: 65 IN | HEART RATE: 76 BPM | DIASTOLIC BLOOD PRESSURE: 64 MMHG | TEMPERATURE: 98.4 F

## 2020-12-10 DIAGNOSIS — N20.2 CALCULUS OF KIDNEY WITH CALCULUS OF URETER: Primary | ICD-10-CM

## 2020-12-10 LAB
BASOPHILS # BLD AUTO: 0.05 10*3/MM3 (ref 0–0.2)
BASOPHILS NFR BLD AUTO: 0.4 % (ref 0–1.5)
BILIRUB UR QL STRIP: NEGATIVE
CLARITY UR: CLEAR
COLOR UR: YELLOW
DEPRECATED RDW RBC AUTO: 36.2 FL (ref 37–54)
EOSINOPHIL # BLD AUTO: 0.07 10*3/MM3 (ref 0–0.4)
EOSINOPHIL NFR BLD AUTO: 0.6 % (ref 0.3–6.2)
ERYTHROCYTE [DISTWIDTH] IN BLOOD BY AUTOMATED COUNT: 11.7 % (ref 12.3–15.4)
FLUAV RNA RESP QL NAA+PROBE: NOT DETECTED
FLUBV RNA RESP QL NAA+PROBE: NOT DETECTED
GLUCOSE UR STRIP-MCNC: NEGATIVE MG/DL
HCT VFR BLD AUTO: 40.4 % (ref 34–46.6)
HGB BLD-MCNC: 14.2 G/DL (ref 12–15.9)
HGB UR QL STRIP.AUTO: NEGATIVE
HOLD SPECIMEN: NORMAL
HOLD SPECIMEN: NORMAL
IMM GRANULOCYTES # BLD AUTO: 0.04 10*3/MM3 (ref 0–0.05)
IMM GRANULOCYTES NFR BLD AUTO: 0.4 % (ref 0–0.5)
KETONES UR QL STRIP: ABNORMAL
LEUKOCYTE ESTERASE UR QL STRIP.AUTO: NEGATIVE
LYMPHOCYTES # BLD AUTO: 2.05 10*3/MM3 (ref 0.7–3.1)
LYMPHOCYTES NFR BLD AUTO: 18.4 % (ref 19.6–45.3)
MCH RBC QN AUTO: 30.1 PG (ref 26.6–33)
MCHC RBC AUTO-ENTMCNC: 35.1 G/DL (ref 31.5–35.7)
MCV RBC AUTO: 85.8 FL (ref 79–97)
MONOCYTES # BLD AUTO: 0.89 10*3/MM3 (ref 0.1–0.9)
MONOCYTES NFR BLD AUTO: 8 % (ref 5–12)
NEUTROPHILS NFR BLD AUTO: 72.2 % (ref 42.7–76)
NEUTROPHILS NFR BLD AUTO: 8.04 10*3/MM3 (ref 1.7–7)
NITRITE UR QL STRIP: NEGATIVE
NRBC BLD AUTO-RTO: 0 /100 WBC (ref 0–0.2)
PH UR STRIP.AUTO: 6 [PH] (ref 5–9)
PLATELET # BLD AUTO: 196 10*3/MM3 (ref 140–450)
PMV BLD AUTO: 10.1 FL (ref 6–12)
PROT UR QL STRIP: NEGATIVE
RBC # BLD AUTO: 4.71 10*6/MM3 (ref 3.77–5.28)
SARS-COV-2 RNA RESP QL NAA+PROBE: NOT DETECTED
SP GR UR STRIP: 1.05 (ref 1–1.03)
UROBILINOGEN UR QL STRIP: ABNORMAL
WBC # BLD AUTO: 11.14 10*3/MM3 (ref 3.4–10.8)
WHOLE BLOOD HOLD SPECIMEN: NORMAL
WHOLE BLOOD HOLD SPECIMEN: NORMAL

## 2020-12-10 PROCEDURE — 25010000002 DEXAMETHASONE PER 1 MG: Performed by: NURSE ANESTHETIST, CERTIFIED REGISTERED

## 2020-12-10 PROCEDURE — C1769 GUIDE WIRE: HCPCS | Performed by: UROLOGY

## 2020-12-10 PROCEDURE — 81003 URINALYSIS AUTO W/O SCOPE: CPT | Performed by: EMERGENCY MEDICINE

## 2020-12-10 PROCEDURE — 96375 TX/PRO/DX INJ NEW DRUG ADDON: CPT

## 2020-12-10 PROCEDURE — 25010000002 HYDROMORPHONE 1 MG/ML SOLUTION: Performed by: EMERGENCY MEDICINE

## 2020-12-10 PROCEDURE — 25010000002 FENTANYL CITRATE (PF) 100 MCG/2ML SOLUTION: Performed by: NURSE ANESTHETIST, CERTIFIED REGISTERED

## 2020-12-10 PROCEDURE — 25010000002 KETOROLAC TROMETHAMINE PER 15 MG: Performed by: EMERGENCY MEDICINE

## 2020-12-10 PROCEDURE — 74420 UROGRAPHY RTRGR +-KUB: CPT

## 2020-12-10 PROCEDURE — 63710000001 ONDANSETRON PER 8 MG: Performed by: EMERGENCY MEDICINE

## 2020-12-10 PROCEDURE — 25010000002 GENTAMICIN PER 80 MG: Performed by: UROLOGY

## 2020-12-10 PROCEDURE — 25010000002 IOPAMIDOL 61 % SOLUTION: Performed by: UROLOGY

## 2020-12-10 PROCEDURE — 25010000002 ONDANSETRON PER 1 MG: Performed by: EMERGENCY MEDICINE

## 2020-12-10 PROCEDURE — C1758 CATHETER, URETERAL: HCPCS | Performed by: UROLOGY

## 2020-12-10 PROCEDURE — 25010000002 MIDAZOLAM PER 1 MG: Performed by: NURSE ANESTHETIST, CERTIFIED REGISTERED

## 2020-12-10 PROCEDURE — 25010000002 PROCHLORPERAZINE 10 MG/2ML SOLUTION: Performed by: EMERGENCY MEDICINE

## 2020-12-10 PROCEDURE — 25010000002 PROPOFOL 10 MG/ML EMULSION: Performed by: NURSE ANESTHETIST, CERTIFIED REGISTERED

## 2020-12-10 PROCEDURE — 87636 SARSCOV2 & INF A&B AMP PRB: CPT | Performed by: UROLOGY

## 2020-12-10 PROCEDURE — 25010000002 ONDANSETRON PER 1 MG: Performed by: UROLOGY

## 2020-12-10 PROCEDURE — 85025 COMPLETE CBC W/AUTO DIFF WBC: CPT | Performed by: UROLOGY

## 2020-12-10 PROCEDURE — C2617 STENT, NON-COR, TEM W/O DEL: HCPCS | Performed by: UROLOGY

## 2020-12-10 PROCEDURE — 96376 TX/PRO/DX INJ SAME DRUG ADON: CPT

## 2020-12-10 PROCEDURE — 25010000002 CEFTRIAXONE PER 250 MG: Performed by: UROLOGY

## 2020-12-10 DEVICE — URETERAL STENT
Type: IMPLANTABLE DEVICE | Site: URETER | Status: FUNCTIONAL
Brand: POLARIS™ ULTRA

## 2020-12-10 RX ORDER — SODIUM CHLORIDE 0.9 % (FLUSH) 0.9 %
10 SYRINGE (ML) INJECTION AS NEEDED
Status: DISCONTINUED | OUTPATIENT
Start: 2020-12-10 | End: 2020-12-10 | Stop reason: HOSPADM

## 2020-12-10 RX ORDER — PROMETHAZINE HYDROCHLORIDE 25 MG/1
25 TABLET ORAL ONCE AS NEEDED
Status: DISCONTINUED | OUTPATIENT
Start: 2020-12-10 | End: 2020-12-10 | Stop reason: HOSPADM

## 2020-12-10 RX ORDER — MORPHINE SULFATE 2 MG/ML
2 INJECTION, SOLUTION INTRAMUSCULAR; INTRAVENOUS
Status: DISCONTINUED | OUTPATIENT
Start: 2020-12-10 | End: 2020-12-10 | Stop reason: HOSPADM

## 2020-12-10 RX ORDER — SODIUM CHLORIDE 0.9 % (FLUSH) 0.9 %
10 SYRINGE (ML) INJECTION EVERY 12 HOURS SCHEDULED
Status: DISCONTINUED | OUTPATIENT
Start: 2020-12-10 | End: 2020-12-10 | Stop reason: HOSPADM

## 2020-12-10 RX ORDER — ONDANSETRON 2 MG/ML
4 INJECTION INTRAMUSCULAR; INTRAVENOUS EVERY 6 HOURS PRN
Status: DISCONTINUED | OUTPATIENT
Start: 2020-12-10 | End: 2020-12-10 | Stop reason: HOSPADM

## 2020-12-10 RX ORDER — SODIUM CHLORIDE 9 MG/ML
100 INJECTION, SOLUTION INTRAVENOUS CONTINUOUS
Status: DISCONTINUED | OUTPATIENT
Start: 2020-12-10 | End: 2020-12-10 | Stop reason: HOSPADM

## 2020-12-10 RX ORDER — OXYCODONE HYDROCHLORIDE AND ACETAMINOPHEN 5; 325 MG/1; MG/1
1 TABLET ORAL EVERY 6 HOURS PRN
Status: DISCONTINUED | OUTPATIENT
Start: 2020-12-10 | End: 2020-12-10 | Stop reason: HOSPADM

## 2020-12-10 RX ORDER — TAMSULOSIN HYDROCHLORIDE 0.4 MG/1
0.4 CAPSULE ORAL ONCE
Status: COMPLETED | OUTPATIENT
Start: 2020-12-10 | End: 2020-12-10

## 2020-12-10 RX ORDER — KETOROLAC TROMETHAMINE 30 MG/ML
30 INJECTION, SOLUTION INTRAMUSCULAR; INTRAVENOUS EVERY 6 HOURS PRN
Status: DISCONTINUED | OUTPATIENT
Start: 2020-12-10 | End: 2020-12-10 | Stop reason: HOSPADM

## 2020-12-10 RX ORDER — PROPOFOL 10 MG/ML
VIAL (ML) INTRAVENOUS AS NEEDED
Status: DISCONTINUED | OUTPATIENT
Start: 2020-12-10 | End: 2020-12-10 | Stop reason: SURG

## 2020-12-10 RX ORDER — OXYCODONE AND ACETAMINOPHEN 7.5; 325 MG/1; MG/1
1-2 TABLET ORAL EVERY 4 HOURS PRN
Qty: 10 TABLET | Refills: 0 | Status: SHIPPED | OUTPATIENT
Start: 2020-12-10 | End: 2021-12-01

## 2020-12-10 RX ORDER — ONDANSETRON 4 MG/1
4 TABLET, ORALLY DISINTEGRATING ORAL EVERY 6 HOURS PRN
Qty: 15 TABLET | Refills: 0 | Status: SHIPPED | OUTPATIENT
Start: 2020-12-10 | End: 2021-12-01

## 2020-12-10 RX ORDER — KETOROLAC TROMETHAMINE 10 MG/1
10 TABLET, FILM COATED ORAL EVERY 6 HOURS PRN
Qty: 15 TABLET | Refills: 0 | Status: SHIPPED | OUTPATIENT
Start: 2020-12-10 | End: 2021-12-01

## 2020-12-10 RX ORDER — FENTANYL CITRATE 50 UG/ML
INJECTION, SOLUTION INTRAMUSCULAR; INTRAVENOUS AS NEEDED
Status: DISCONTINUED | OUTPATIENT
Start: 2020-12-10 | End: 2020-12-10 | Stop reason: SURG

## 2020-12-10 RX ORDER — MIDAZOLAM HYDROCHLORIDE 1 MG/ML
INJECTION INTRAMUSCULAR; INTRAVENOUS AS NEEDED
Status: DISCONTINUED | OUTPATIENT
Start: 2020-12-10 | End: 2020-12-10 | Stop reason: SURG

## 2020-12-10 RX ORDER — LEVOFLOXACIN 250 MG/1
250 TABLET ORAL DAILY
Qty: 7 TABLET | Refills: 0 | Status: SHIPPED | OUTPATIENT
Start: 2020-12-10 | End: 2020-12-17

## 2020-12-10 RX ORDER — PROMETHAZINE HYDROCHLORIDE 25 MG/1
25 SUPPOSITORY RECTAL ONCE AS NEEDED
Status: DISCONTINUED | OUTPATIENT
Start: 2020-12-10 | End: 2020-12-10 | Stop reason: HOSPADM

## 2020-12-10 RX ORDER — ONDANSETRON 4 MG/1
4 TABLET, FILM COATED ORAL EVERY 6 HOURS PRN
Status: DISCONTINUED | OUTPATIENT
Start: 2020-12-10 | End: 2020-12-10 | Stop reason: HOSPADM

## 2020-12-10 RX ORDER — MEPERIDINE HYDROCHLORIDE 25 MG/ML
12.5 INJECTION INTRAMUSCULAR; INTRAVENOUS; SUBCUTANEOUS
Status: DISCONTINUED | OUTPATIENT
Start: 2020-12-10 | End: 2020-12-10 | Stop reason: HOSPADM

## 2020-12-10 RX ORDER — OXYCODONE HYDROCHLORIDE AND ACETAMINOPHEN 5; 325 MG/1; MG/1
1 TABLET ORAL EVERY 6 HOURS PRN
Qty: 15 TABLET | Refills: 0 | Status: SHIPPED | OUTPATIENT
Start: 2020-12-10 | End: 2021-12-01

## 2020-12-10 RX ORDER — ONDANSETRON 2 MG/ML
4 INJECTION INTRAMUSCULAR; INTRAVENOUS ONCE AS NEEDED
Status: DISCONTINUED | OUTPATIENT
Start: 2020-12-10 | End: 2020-12-10 | Stop reason: HOSPADM

## 2020-12-10 RX ORDER — TAMSULOSIN HYDROCHLORIDE 0.4 MG/1
1 CAPSULE ORAL DAILY
Qty: 30 CAPSULE | Refills: 0 | Status: SHIPPED | OUTPATIENT
Start: 2020-12-10 | End: 2021-12-01

## 2020-12-10 RX ORDER — DEXAMETHASONE SODIUM PHOSPHATE 4 MG/ML
INJECTION, SOLUTION INTRA-ARTICULAR; INTRALESIONAL; INTRAMUSCULAR; INTRAVENOUS; SOFT TISSUE AS NEEDED
Status: DISCONTINUED | OUTPATIENT
Start: 2020-12-10 | End: 2020-12-10 | Stop reason: SURG

## 2020-12-10 RX ORDER — SODIUM CHLORIDE, SODIUM GLUCONATE, SODIUM ACETATE, POTASSIUM CHLORIDE, AND MAGNESIUM CHLORIDE 526; 502; 368; 37; 30 MG/100ML; MG/100ML; MG/100ML; MG/100ML; MG/100ML
1000 INJECTION, SOLUTION INTRAVENOUS CONTINUOUS
Status: DISCONTINUED | OUTPATIENT
Start: 2020-12-10 | End: 2020-12-10 | Stop reason: HOSPADM

## 2020-12-10 RX ORDER — LIDOCAINE HYDROCHLORIDE 10 MG/ML
0.5 INJECTION, SOLUTION INFILTRATION; PERINEURAL ONCE AS NEEDED
Status: DISCONTINUED | OUTPATIENT
Start: 2020-12-10 | End: 2020-12-10 | Stop reason: HOSPADM

## 2020-12-10 RX ORDER — LIDOCAINE HYDROCHLORIDE 20 MG/ML
INJECTION, SOLUTION INFILTRATION; PERINEURAL AS NEEDED
Status: DISCONTINUED | OUTPATIENT
Start: 2020-12-10 | End: 2020-12-10 | Stop reason: SURG

## 2020-12-10 RX ORDER — PROCHLORPERAZINE EDISYLATE 5 MG/ML
5 INJECTION INTRAMUSCULAR; INTRAVENOUS EVERY 6 HOURS PRN
Status: DISCONTINUED | OUTPATIENT
Start: 2020-12-10 | End: 2020-12-10 | Stop reason: HOSPADM

## 2020-12-10 RX ADMIN — HYDROMORPHONE HYDROCHLORIDE 0.5 MG: 1 INJECTION, SOLUTION INTRAMUSCULAR; INTRAVENOUS; SUBCUTANEOUS at 00:02

## 2020-12-10 RX ADMIN — GENTAMICIN SULFATE 60 MG: 40 INJECTION, SOLUTION INTRAMUSCULAR; INTRAVENOUS at 18:45

## 2020-12-10 RX ADMIN — DEXAMETHASONE SODIUM PHOSPHATE 4 MG: 4 INJECTION, SOLUTION INTRAMUSCULAR; INTRAVENOUS at 17:33

## 2020-12-10 RX ADMIN — PROCHLORPERAZINE EDISYLATE 5 MG: 5 INJECTION INTRAMUSCULAR; INTRAVENOUS at 00:57

## 2020-12-10 RX ADMIN — FENTANYL CITRATE 50 MCG: 50 INJECTION, SOLUTION INTRAMUSCULAR; INTRAVENOUS at 17:29

## 2020-12-10 RX ADMIN — MIDAZOLAM HYDROCHLORIDE 2 MG: 2 INJECTION, SOLUTION INTRAMUSCULAR; INTRAVENOUS at 17:25

## 2020-12-10 RX ADMIN — PROPOFOL 120 MG: 10 INJECTION, EMULSION INTRAVENOUS at 17:29

## 2020-12-10 RX ADMIN — KETOROLAC TROMETHAMINE 30 MG: 30 INJECTION, SOLUTION INTRAMUSCULAR; INTRAVENOUS at 02:11

## 2020-12-10 RX ADMIN — CEFTRIAXONE SODIUM 1 G: 1 INJECTION, POWDER, FOR SOLUTION INTRAMUSCULAR; INTRAVENOUS at 16:19

## 2020-12-10 RX ADMIN — SODIUM CHLORIDE, SODIUM GLUCONATE, SODIUM ACETATE, POTASSIUM CHLORIDE, AND MAGNESIUM CHLORIDE: 526; 502; 368; 37; 30 INJECTION, SOLUTION INTRAVENOUS at 17:40

## 2020-12-10 RX ADMIN — ONDANSETRON 4 MG: 2 INJECTION INTRAMUSCULAR; INTRAVENOUS at 17:53

## 2020-12-10 RX ADMIN — TAMSULOSIN HYDROCHLORIDE 0.4 MG: 0.4 CAPSULE ORAL at 02:11

## 2020-12-10 RX ADMIN — ONDANSETRON HYDROCHLORIDE 4 MG: 2 SOLUTION INTRAMUSCULAR; INTRAVENOUS at 00:01

## 2020-12-10 RX ADMIN — LIDOCAINE HYDROCHLORIDE 100 MG: 20 INJECTION, SOLUTION INFILTRATION; PERINEURAL at 17:29

## 2020-12-10 NOTE — ANESTHESIA PROCEDURE NOTES
Airway  Urgency: elective    Date/Time: 12/10/2020 5:31 PM  Airway not difficult    General Information and Staff    Patient location during procedure: OR    Indications and Patient Condition  Indications for airway management: airway protection    Preoxygenated: yes  Mask difficulty assessment: 1 - vent by mask    Final Airway Details  Final airway type: supraglottic airway      Successful airway: I-gel  Size 4    Cormack-Lehane Classification: grade I - full view of glottis  Number of attempts at approach: 1  Assessment: lips, teeth, and gum same as pre-op and atraumatic intubation

## 2020-12-10 NOTE — ANESTHESIA PREPROCEDURE EVALUATION
Anesthesia Evaluation     Patient summary reviewed   NPO Solid Status: > 8 hours  NPO Liquid Status: > 6 hours           Airway   Mallampati: II  TM distance: <3 FB  Neck ROM: full  No difficulty expected and Large neck circumference  Dental - normal exam   (+) poor dentition    Pulmonary - normal exam    breath sounds clear to auscultation  (+) shortness of breath,   (-) not a smoker  Cardiovascular - normal exam  Exercise tolerance: poor (<4 METS)    NYHA Classification: III  ECG reviewed  Rhythm: regular    (+) hypertension, hyperlipidemia,       Neuro/Psych  (+) headaches, weakness, numbness, psychiatric history Anxiety and Depression,     GI/Hepatic/Renal/Endo    (+)  GERD,  renal disease stones,     Musculoskeletal     (+) back pain, chronic pain, gait problem, neck pain,   Abdominal     Abdomen: tender.   Substance History      OB/GYN          Other   arthritis,                        Anesthesia Plan    ASA 3     general     intravenous induction     Anesthetic plan, all risks, benefits, and alternatives have been provided, discussed and informed consent has been obtained with: patient.

## 2020-12-11 ENCOUNTER — PREP FOR SURGERY (OUTPATIENT)
Dept: OTHER | Facility: HOSPITAL | Age: 60
End: 2020-12-11

## 2020-12-11 DIAGNOSIS — N20.1 CALCULUS OF URETER: Primary | ICD-10-CM

## 2020-12-11 NOTE — ANESTHESIA POSTPROCEDURE EVALUATION
Patient: Nohelia Yo    Procedure Summary     Date: 12/10/20 Room / Location: [facility] OR 02 / [facility] OR    Anesthesia Start: 1727 Anesthesia Stop: 1805    Procedure: CYSTOSCOPY,stone manipulation, LEFT RETROGRADE, left  STENT PLACEMENT (Left ) Diagnosis:       Calculus of kidney with calculus of ureter      (Calculus of kidney with calculus of ureter [N20.2])    Surgeon: Cam Brooks MD Provider: Charlie Brock MD    Anesthesia Type: general ASA Status: 3          Anesthesia Type: general    Vitals  No vitals data found for the desired time range.          Post Anesthesia Care and Evaluation    Patient location during evaluation: PACU  Patient participation: complete - patient participated  Level of consciousness: awake and alert  Pain score: 1  Pain management: adequate  Airway patency: patent  Anesthetic complications: No anesthetic complications  PONV Status: none  Cardiovascular status: acceptable  Respiratory status: acceptable  Hydration status: acceptable  Post Neuraxial Block status: Motor and sensory function returned to baseline

## 2020-12-12 NOTE — H&P
[facility] History and Physical  AILIN PACHECO  60-year-old; :1960;;female  [address];[place] [address]  Ins: 1) MANOLO/ARIAS  MRN:[id number]  GIOVANNY LEMUS MD  [facility] [place]  Allergies  No Known Allergies Unknown  Current Medications  Percocet 10 mg-325 mg oral tablet  ondansetron 4 mg oral tablet, disintegrating  Flomax 0.4 mg oral capsule  * Medications Reconciled  Medical History  No medical history noted.  Patient reports having had a colonoscopy within  the past 9 years.  Surgical History  Foot surgery    Obstetric/Gynecologic History  She no longer has menstrual periods.  Psychiatric History  Patient is generally satisfied with life. No  depression, anxiety or thoughts of suicide.  Family History  None  NONE LISTED  Mother  Father  Brother  Alive Brother Alive Sister Alive Sister Alive  Social History  Never smoked. Does not drink alcohol.  Employed. . Lives with her .  Imm/Inj/Office Meds History Comments  Patient has had influenza vaccination for this  season.   Has not had pneumonia vaccine.  Chief Complaint  UPJ stone  History of Present Illness  AILIN PACHECO is a 60-year-old female here for evaluation. She has a history of 6 mm left UPJ stone causing hydronephrosis with perinephric inflammation and fluid collection with associated pain, nausea and vomiting. Patient had left retrograde, stent placement on 12/10/20. Comes now for treatment of stone.  Location: left kidney  Severity: moderate  Onset / Duration: 1 day  Timing: constant  Modifying factors: Toradol; left j-stent  Associated signs and symptoms: flank pain, nausea/vomiting  Review of Systems  Eyes: Denies: blurry vision, pain in the eyes and double vision  ENMT: Denies: ear pain, sore throat and sinus problems  Cardiovascular: Denies: chest pain, varicose veins, angina and syncope  Respiratory: Denies: shortness of breath, wheezing  and frequent cough  Gastrointestinal: Reports: abdominal pain, nausea and vomiting; Denies: indigestion and heartburn  Genitourinary: Denies: other symptoms (see HPI)  Musculoskeletal: Denies: joint pain, neck pain and back pain  Integumentary: Denies: skin rash, boils and persistent itch  Neurological: Denies: tremors, dizzy spells and numbness / tingling  Psychiatric: Reports: generally satisfied with life; Denies: depression, suicidal  ideation and anxiety  Endocrine: Denies: Excessive thirst, cold intolerance, heat intolerance and  tired/sluggish  Hematologic/Lymphatic: Denies: swollen glands and blood clotting problem  Allergic/Immunologic: Denies: hayfever  Constitutional: Reports: chills; Denies: fever and weight loss  Vital Signs  VITAL SIGNS NOT TAKEN DUE TO COVID 19 RESTRICTIONS  Weight: 180 (lb) Resp Rate: 18 (/min)  Height: 65 (in) BMI: 29.95  Smoking Status: Never smoker  Exam  General appearance: The patient appears well developed and well nourished, in no apparent acute distress.  Examination of pupils and irises: No redness or drainage noted.  Assessment of hearing: Responds to verbal command.  Examination of neck: Neck appears supple. No masses noted.  Assessment of respiratory effort: Respiratory effort appears normal. No apparent  distress.  Examination of abdomen: LEFT CVA tenderness  Examination of gait and station: Normal gait and stature.  Head and neck (Assessment of range of motion): Adequate ROM noted in all  extremities.  Head and neck (Assessment of muscle strength and tone): Muscle strength and tone  normal for age.  Inspection of skin and subcutaneous tissue: Exam of the skin is within normal limits. The color and skin turgor are normal. No lesions, bruises, rashes, or jaundice.  Orientation to time, place and person: Oriented to person, place, and time.  Mood and affect: Normal mood and affect.  Data Review  Medications and chart reviewed. History and physical form/ROS  reviewed.  Assessment - Calculus in pelviureteric junction  DX:  Calculus in pelviureteric junction  SNO: [id number], ICD-9: 592.0, ICD-10: N20.2  Hydronephrosis  SNO: [id number], ICD-10: N13.30  Left flank pain  SNO: [id number], ICD-9: 789.00, ICD-10: R10.9  Nausea and vomiting  SNO: [id number], ICD-9: 787.01, ICD-10: R11.2  Assessment Notes:  UPJ stone with left j-stent  Plan  Plan Notes:  Cystoscopy, left retrograde, ureteroscopy, laser lithotripsy, stent placement.  Procedures:  72367.QW UA, AUTOMATEDW/O MICRO  Labs:  URINE [facility]; (Routine); [facility]  Discussion:  Discussed my findings and plan of action and reasoning behind decision making. All questions were answered. FINDINGS WERE DISCUSSED WITH PATIENT. RISKS AND BENEFITS EXPLAINED. PATIENT WISHES TO PROCEED.  Instructions:  Patient is instructed to call with any problems. Patient is instructed to call if the  condition worsens. Patient is instructed to call with any changes in condition. Patient is instructed to call if she has any intractable pain, fever, chills, nausea, or vomiting.

## 2020-12-13 ENCOUNTER — LAB (OUTPATIENT)
Dept: LAB | Facility: HOSPITAL | Age: 60
End: 2020-12-13

## 2020-12-13 DIAGNOSIS — Z01.818 PREOP TESTING: Primary | ICD-10-CM

## 2020-12-13 LAB — SARS-COV-2 N GENE RESP QL NAA+PROBE: NOT DETECTED

## 2020-12-13 PROCEDURE — C9803 HOPD COVID-19 SPEC COLLECT: HCPCS

## 2020-12-13 PROCEDURE — 87635 SARS-COV-2 COVID-19 AMP PRB: CPT

## 2020-12-16 ENCOUNTER — ANESTHESIA (OUTPATIENT)
Dept: PERIOP | Facility: HOSPITAL | Age: 60
End: 2020-12-16

## 2020-12-16 ENCOUNTER — APPOINTMENT (OUTPATIENT)
Dept: GENERAL RADIOLOGY | Facility: HOSPITAL | Age: 60
End: 2020-12-16

## 2020-12-16 ENCOUNTER — HOSPITAL ENCOUNTER (OUTPATIENT)
Facility: HOSPITAL | Age: 60
Setting detail: HOSPITAL OUTPATIENT SURGERY
Discharge: HOME | End: 2020-12-16
Attending: UROLOGY | Admitting: UROLOGY

## 2020-12-16 ENCOUNTER — ANESTHESIA EVENT (OUTPATIENT)
Dept: PERIOP | Facility: HOSPITAL | Age: 60
End: 2020-12-16

## 2020-12-16 VITALS
SYSTOLIC BLOOD PRESSURE: 173 MMHG | OXYGEN SATURATION: 96 % | WEIGHT: 175.04 LBS | RESPIRATION RATE: 18 BRPM | BODY MASS INDEX: 29.16 KG/M2 | TEMPERATURE: 98 F | HEIGHT: 65 IN | HEART RATE: 77 BPM | DIASTOLIC BLOOD PRESSURE: 79 MMHG

## 2020-12-16 DIAGNOSIS — N20.1 CALCULUS OF URETER: ICD-10-CM

## 2020-12-16 DIAGNOSIS — N20.2 CALCULUS OF KIDNEY WITH CALCULUS OF URETER: Primary | ICD-10-CM

## 2020-12-16 LAB
BILIRUB UR QL STRIP: NEGATIVE
CLARITY UR: ABNORMAL
COLOR UR: ABNORMAL
GLUCOSE UR STRIP-MCNC: NEGATIVE MG/DL
HGB UR QL STRIP.AUTO: ABNORMAL
KETONES UR QL STRIP: NEGATIVE
LEUKOCYTE ESTERASE UR QL STRIP.AUTO: ABNORMAL
NITRITE UR QL STRIP: NEGATIVE
PH UR STRIP.AUTO: 5.5 [PH] (ref 5–9)
PROT UR QL STRIP: ABNORMAL
SP GR UR STRIP: 1.02 (ref 1–1.03)
UROBILINOGEN UR QL STRIP: ABNORMAL

## 2020-12-16 PROCEDURE — 25010000002 MIDAZOLAM PER 1 MG: Performed by: NURSE ANESTHETIST, CERTIFIED REGISTERED

## 2020-12-16 PROCEDURE — 25010000002 FENTANYL CITRATE (PF) 100 MCG/2ML SOLUTION: Performed by: NURSE ANESTHETIST, CERTIFIED REGISTERED

## 2020-12-16 PROCEDURE — 25010000002 DEXAMETHASONE PER 1 MG: Performed by: NURSE ANESTHETIST, CERTIFIED REGISTERED

## 2020-12-16 PROCEDURE — C1769 GUIDE WIRE: HCPCS | Performed by: UROLOGY

## 2020-12-16 PROCEDURE — 81003 URINALYSIS AUTO W/O SCOPE: CPT | Performed by: UROLOGY

## 2020-12-16 PROCEDURE — C1758 CATHETER, URETERAL: HCPCS | Performed by: UROLOGY

## 2020-12-16 PROCEDURE — 25010000002 PROPOFOL 10 MG/ML EMULSION: Performed by: NURSE ANESTHETIST, CERTIFIED REGISTERED

## 2020-12-16 PROCEDURE — 74420 UROGRAPHY RTRGR +-KUB: CPT

## 2020-12-16 PROCEDURE — 25010000002 ONDANSETRON PER 1 MG: Performed by: NURSE ANESTHETIST, CERTIFIED REGISTERED

## 2020-12-16 PROCEDURE — C2617 STENT, NON-COR, TEM W/O DEL: HCPCS | Performed by: UROLOGY

## 2020-12-16 PROCEDURE — 25010000002 IOPAMIDOL 61 % SOLUTION: Performed by: UROLOGY

## 2020-12-16 PROCEDURE — C1894 INTRO/SHEATH, NON-LASER: HCPCS | Performed by: UROLOGY

## 2020-12-16 PROCEDURE — 25010000002 CEFTRIAXONE: Performed by: UROLOGY

## 2020-12-16 DEVICE — URETERAL STENT
Type: IMPLANTABLE DEVICE | Status: FUNCTIONAL
Brand: PERCUFLEX™ PLUS

## 2020-12-16 RX ORDER — EPHEDRINE SULFATE 50 MG/ML
INJECTION, SOLUTION INTRAVENOUS AS NEEDED
Status: DISCONTINUED | OUTPATIENT
Start: 2020-12-16 | End: 2020-12-16 | Stop reason: SURG

## 2020-12-16 RX ORDER — DIPHENHYDRAMINE HYDROCHLORIDE 50 MG/ML
12.5 INJECTION INTRAMUSCULAR; INTRAVENOUS
Status: DISCONTINUED | OUTPATIENT
Start: 2020-12-16 | End: 2020-12-16 | Stop reason: HOSPADM

## 2020-12-16 RX ORDER — MIDAZOLAM HYDROCHLORIDE 1 MG/ML
INJECTION INTRAMUSCULAR; INTRAVENOUS AS NEEDED
Status: DISCONTINUED | OUTPATIENT
Start: 2020-12-16 | End: 2020-12-16 | Stop reason: SURG

## 2020-12-16 RX ORDER — EPHEDRINE SULFATE 50 MG/ML
5 INJECTION, SOLUTION INTRAVENOUS ONCE AS NEEDED
Status: DISCONTINUED | OUTPATIENT
Start: 2020-12-16 | End: 2020-12-16 | Stop reason: HOSPADM

## 2020-12-16 RX ORDER — ONDANSETRON 2 MG/ML
INJECTION INTRAMUSCULAR; INTRAVENOUS AS NEEDED
Status: DISCONTINUED | OUTPATIENT
Start: 2020-12-16 | End: 2020-12-16 | Stop reason: SURG

## 2020-12-16 RX ORDER — OXYCODONE AND ACETAMINOPHEN 7.5; 325 MG/1; MG/1
1-2 TABLET ORAL EVERY 4 HOURS PRN
Qty: 10 TABLET | Refills: 0 | Status: SHIPPED | OUTPATIENT
Start: 2020-12-16 | End: 2021-12-01

## 2020-12-16 RX ORDER — FLUMAZENIL 0.1 MG/ML
0.2 INJECTION INTRAVENOUS AS NEEDED
Status: DISCONTINUED | OUTPATIENT
Start: 2020-12-16 | End: 2020-12-16 | Stop reason: HOSPADM

## 2020-12-16 RX ORDER — PROPOFOL 10 MG/ML
VIAL (ML) INTRAVENOUS AS NEEDED
Status: DISCONTINUED | OUTPATIENT
Start: 2020-12-16 | End: 2020-12-16 | Stop reason: SURG

## 2020-12-16 RX ORDER — ONDANSETRON 2 MG/ML
4 INJECTION INTRAMUSCULAR; INTRAVENOUS ONCE AS NEEDED
Status: DISCONTINUED | OUTPATIENT
Start: 2020-12-16 | End: 2020-12-16 | Stop reason: HOSPADM

## 2020-12-16 RX ORDER — SODIUM CHLORIDE, SODIUM GLUCONATE, SODIUM ACETATE, POTASSIUM CHLORIDE, AND MAGNESIUM CHLORIDE 526; 502; 368; 37; 30 MG/100ML; MG/100ML; MG/100ML; MG/100ML; MG/100ML
1000 INJECTION, SOLUTION INTRAVENOUS CONTINUOUS
Status: DISCONTINUED | OUTPATIENT
Start: 2020-12-16 | End: 2020-12-16 | Stop reason: HOSPADM

## 2020-12-16 RX ORDER — NALOXONE HCL 0.4 MG/ML
0.4 VIAL (ML) INJECTION AS NEEDED
Status: DISCONTINUED | OUTPATIENT
Start: 2020-12-16 | End: 2020-12-16 | Stop reason: HOSPADM

## 2020-12-16 RX ORDER — ACETAMINOPHEN 650 MG/1
650 SUPPOSITORY RECTAL ONCE AS NEEDED
Status: DISCONTINUED | OUTPATIENT
Start: 2020-12-16 | End: 2020-12-16 | Stop reason: HOSPADM

## 2020-12-16 RX ORDER — MEPERIDINE HYDROCHLORIDE 25 MG/ML
12.5 INJECTION INTRAMUSCULAR; INTRAVENOUS; SUBCUTANEOUS
Status: DISCONTINUED | OUTPATIENT
Start: 2020-12-16 | End: 2020-12-16 | Stop reason: HOSPADM

## 2020-12-16 RX ORDER — ACETAMINOPHEN 325 MG/1
650 TABLET ORAL ONCE AS NEEDED
Status: DISCONTINUED | OUTPATIENT
Start: 2020-12-16 | End: 2020-12-16 | Stop reason: HOSPADM

## 2020-12-16 RX ORDER — DEXAMETHASONE SODIUM PHOSPHATE 4 MG/ML
INJECTION, SOLUTION INTRA-ARTICULAR; INTRALESIONAL; INTRAMUSCULAR; INTRAVENOUS; SOFT TISSUE AS NEEDED
Status: DISCONTINUED | OUTPATIENT
Start: 2020-12-16 | End: 2020-12-16 | Stop reason: SURG

## 2020-12-16 RX ORDER — PROMETHAZINE HYDROCHLORIDE 25 MG/1
25 SUPPOSITORY RECTAL ONCE AS NEEDED
Status: DISCONTINUED | OUTPATIENT
Start: 2020-12-16 | End: 2020-12-16 | Stop reason: HOSPADM

## 2020-12-16 RX ORDER — PROMETHAZINE HYDROCHLORIDE 25 MG/1
25 TABLET ORAL ONCE AS NEEDED
Status: DISCONTINUED | OUTPATIENT
Start: 2020-12-16 | End: 2020-12-16 | Stop reason: HOSPADM

## 2020-12-16 RX ORDER — FENTANYL CITRATE 50 UG/ML
INJECTION, SOLUTION INTRAMUSCULAR; INTRAVENOUS AS NEEDED
Status: DISCONTINUED | OUTPATIENT
Start: 2020-12-16 | End: 2020-12-16 | Stop reason: SURG

## 2020-12-16 RX ORDER — LIDOCAINE HYDROCHLORIDE 20 MG/ML
INJECTION, SOLUTION INFILTRATION; PERINEURAL AS NEEDED
Status: DISCONTINUED | OUTPATIENT
Start: 2020-12-16 | End: 2020-12-16 | Stop reason: SURG

## 2020-12-16 RX ORDER — LEVOFLOXACIN 250 MG/1
250 TABLET ORAL DAILY
Qty: 7 TABLET | Refills: 0 | Status: SHIPPED | OUTPATIENT
Start: 2020-12-16 | End: 2020-12-23

## 2020-12-16 RX ADMIN — FENTANYL CITRATE 25 MCG: 50 INJECTION, SOLUTION INTRAMUSCULAR; INTRAVENOUS at 09:36

## 2020-12-16 RX ADMIN — PROPOFOL 30 MG: 10 INJECTION, EMULSION INTRAVENOUS at 08:50

## 2020-12-16 RX ADMIN — LIDOCAINE HYDROCHLORIDE 80 MG: 20 INJECTION, SOLUTION INFILTRATION; PERINEURAL at 08:49

## 2020-12-16 RX ADMIN — PROPOFOL 150 MG: 10 INJECTION, EMULSION INTRAVENOUS at 08:49

## 2020-12-16 RX ADMIN — CEFTRIAXONE 1 G: 1 INJECTION, POWDER, FOR SOLUTION INTRAMUSCULAR; INTRAVENOUS at 08:53

## 2020-12-16 RX ADMIN — FENTANYL CITRATE 25 MCG: 50 INJECTION, SOLUTION INTRAMUSCULAR; INTRAVENOUS at 08:49

## 2020-12-16 RX ADMIN — SODIUM CHLORIDE, SODIUM GLUCONATE, SODIUM ACETATE, POTASSIUM CHLORIDE, AND MAGNESIUM CHLORIDE 1000 ML: 526; 502; 368; 37; 30 INJECTION, SOLUTION INTRAVENOUS at 06:45

## 2020-12-16 RX ADMIN — FENTANYL CITRATE 25 MCG: 50 INJECTION, SOLUTION INTRAMUSCULAR; INTRAVENOUS at 09:00

## 2020-12-16 RX ADMIN — ONDANSETRON 4 MG: 2 INJECTION INTRAMUSCULAR; INTRAVENOUS at 09:45

## 2020-12-16 RX ADMIN — MIDAZOLAM HYDROCHLORIDE 2 MG: 2 INJECTION, SOLUTION INTRAMUSCULAR; INTRAVENOUS at 08:41

## 2020-12-16 RX ADMIN — EPHEDRINE SULFATE 10 MG: 50 INJECTION INTRAVENOUS at 09:10

## 2020-12-16 RX ADMIN — FENTANYL CITRATE 25 MCG: 50 INJECTION, SOLUTION INTRAMUSCULAR; INTRAVENOUS at 09:16

## 2020-12-16 RX ADMIN — DEXAMETHASONE SODIUM PHOSPHATE 4 MG: 4 INJECTION, SOLUTION INTRAMUSCULAR; INTRAVENOUS at 08:54

## 2020-12-16 RX ADMIN — EPHEDRINE SULFATE 10 MG: 50 INJECTION INTRAVENOUS at 09:03

## 2020-12-16 NOTE — OP NOTE
CYSTOSCOPY URETEROSCOPY RETROGRADE PYELOGRAM HOLMIUM LASER STENT INSERTION  Procedure Note    Nohelia Yo  12/16/2020    Pre-op Diagnosis:   Calculus of ureter [N20.1]    Post-op Diagnosis:     Post-Op Diagnosis Codes:     * Calculus of ureter [N20.1]    Procedure(s):  CYSTOSCOPY, LEFT RETROGRADE, URETEROSCOPY, LASER LITHOTRIPSY, STENT PLACEMENT    Surgeon(s):  Cam Brooks MD    Anesthesia: General    Staff:   Circulator: Ludmila Valdes RN; Phyllis Hernandez RN  Scrub Person: Becca Ricketts  Assistant: Cris Valelcillo CSA    Assistant: Cris Vallecillo CSA was responsible for performing the following activities: Irrigation and their skilled assistance was necessary for the success of this case.     Estimated Blood Loss: minimal    Specimens:                ID Type Source Tests Collected by Time   A : Old Stent from Left Ureter Tissue Ureter, Left TISSUE PATHOLOGY EXAM CusickCam MD 12/16/2020 0916         Drains: * No LDAs found *    Findings: 2 renal stones with retained J stent    Complications: None    Indications: Same.    Description of Procedure: Patient brought to surgery placed in dorsolithotomy position.  Sterile prep and drape genitalia routine fashion I passed a 22 Upper sorbian cystoscope into the bladder left double-J stent was pulled down I put no 3 8 guidewire into the left renal pelvis a retrograde catheter over top that shot retrograde stents.  Once this is accomplished I took the navigator and dilated the ureter and then placed the lithoview ureteroscope through the navigator over the top guidewire into the left renal pelvis then with a dust mode and the fragmentation mode on the Ashok laser we found the midpole of the lower pole stones and with a 365 fiber Ashok laser was used to fragment the stone pieces.  We flushed out to debris remove the lithoview over top the guidewire and over top of our cystoscope we placed a 8 x 26 double-J stent.  Bladder was drained scope was  removed patient taken recovery    Cam Brooks MD     Date: 12/16/2020  Time: 10:04 CST

## 2020-12-16 NOTE — ANESTHESIA PROCEDURE NOTES
Airway  Urgency: elective    Date/Time: 12/16/2020 8:51 AM  Airway not difficult    General Information and Staff    Patient location during procedure: OR  CRNA: Drea Hernandez CRNA    Indications and Patient Condition  Indications for airway management: airway protection    Preoxygenated: yes  Mask difficulty assessment: 0 - not attempted    Final Airway Details  Final airway type: supraglottic airway      Successful airway: I-gel  Size 4    Number of attempts at approach: 1  Assessment: lips, teeth, and gum same as pre-op

## 2020-12-16 NOTE — ANESTHESIA POSTPROCEDURE EVALUATION
Patient: Nohelia Yo    Procedure Summary     Date: 12/16/20 Room / Location: [facility] OR 02 / [facility] OR    Anesthesia Start: 0843 Anesthesia Stop: 1000    Procedure: CYSTOSCOPY, LEFT RETROGRADE, URETEROSCOPY, LASER LITHOTRIPSY, STENT PLACEMENT (Left ) Diagnosis:       Calculus of ureter      (Calculus of ureter [N20.1])    Surgeon: Cam Brooks MD Provider: Bowen Amaya MD    Anesthesia Type: general ASA Status: 3          Anesthesia Type: general    Vitals  No vitals data found for the desired time range.          Post Anesthesia Care and Evaluation    Patient location during evaluation: PACU  Patient participation: waiting for patient participation  Level of consciousness: obtunded/minimal responses  Pain management: adequate  Airway patency: patent  Anesthetic complications: No anesthetic complications  PONV Status: none  Cardiovascular status: acceptable  Respiratory status: acceptable, room air and oral airway (lma in place)  Hydration status: acceptable

## 2020-12-16 NOTE — ANESTHESIA PREPROCEDURE EVALUATION
Anesthesia Evaluation     Patient summary reviewed and Nursing notes reviewed   no history of anesthetic complications:  NPO Solid Status: > 8 hours  NPO Liquid Status: > 8 hours           Airway   Mallampati: II  TM distance: >3 FB  Neck ROM: full  possible difficult intubation  Dental    (+) poor dentation    Pulmonary - negative pulmonary ROS and normal exam    breath sounds clear to auscultation  (-) not a smoker  Cardiovascular - negative cardio ROS and normal exam    Rhythm: regular  Rate: normal    (-) murmur      Neuro/Psych- negative ROS  GI/Hepatic/Renal/Endo    (+) obesity,   renal disease (Creatinine 1.20) stones,     Musculoskeletal     (+) neck pain,       ROS comment: FINDINGS:   AP, lateral and open mouth odontoid views are obtained of the  cervical spine.   The odontoid process is intact. The lateral masses of C1  demonstrate normal anatomic alignment and symmetry about the  dens.   There is disc space narrowing at C5-C6.  The cervical vertebra demonstrate normal vertebral body heights,  alignment and otherwise normal intervertebral disc spacing.  There is no evidence of acute fracture or subluxation.   The prevertebral soft tissues are within normal limits.     IMPRESSION:  CONCLUSION:    Degenerative changes at C5-C6. Otherwise unremarkable exam.     Electronically signed by:  Varghese Auguste MD  6/20/2019 10:38 AM  Abdominal    Substance History - negative use     OB/GYN negative ob/gyn ROS         Other   arthritis,                      Anesthesia Plan    ASA 3     general     intravenous induction     Anesthetic plan, all risks, benefits, and alternatives have been provided, discussed and informed consent has been obtained with: patient.

## 2020-12-18 LAB
LAB AP CASE REPORT: NORMAL
PATH REPORT.FINAL DX SPEC: NORMAL

## 2021-06-24 RX ORDER — DOXYCYCLINE 100 MG/1
100 CAPSULE ORAL 2 TIMES DAILY
Qty: 28 CAPSULE | Refills: 1 | Status: SHIPPED | OUTPATIENT
Start: 2021-06-24 | End: 2021-12-01

## 2021-06-24 RX ORDER — DOXYCYCLINE 100 MG/1
100 CAPSULE ORAL 2 TIMES DAILY
Qty: 28 CAPSULE | Refills: 1 | Status: SHIPPED | OUTPATIENT
Start: 2021-06-24 | End: 2021-06-24 | Stop reason: SDUPTHER

## 2021-11-19 ENCOUNTER — HOSPITAL ENCOUNTER (OUTPATIENT)
Dept: GENERAL RADIOLOGY | Facility: HOSPITAL | Age: 61
Discharge: HOME | End: 2021-11-19

## 2021-11-19 ENCOUNTER — HOSPITAL ENCOUNTER (OUTPATIENT)
Dept: ULTRASOUND IMAGING | Facility: HOSPITAL | Age: 61
Discharge: HOME | End: 2021-11-19

## 2021-11-19 DIAGNOSIS — R10.9 STOMACH ACHE: ICD-10-CM

## 2021-11-19 PROCEDURE — 76775 US EXAM ABDO BACK WALL LIM: CPT

## 2021-11-19 PROCEDURE — 74018 RADEX ABDOMEN 1 VIEW: CPT

## 2021-11-22 ENCOUNTER — TELEPHONE (OUTPATIENT)
Dept: FAMILY MEDICINE CLINIC | Facility: CLINIC | Age: 61
End: 2021-11-22

## 2021-11-22 DIAGNOSIS — Z12.31 VISIT FOR SCREENING MAMMOGRAM: Primary | ICD-10-CM

## 2021-12-01 ENCOUNTER — OFFICE VISIT (OUTPATIENT)
Dept: FAMILY MEDICINE CLINIC | Facility: CLINIC | Age: 61
End: 2021-12-01

## 2021-12-01 ENCOUNTER — LAB (OUTPATIENT)
Dept: LAB | Facility: HOSPITAL | Age: 61
End: 2021-12-01

## 2021-12-01 VITALS
DIASTOLIC BLOOD PRESSURE: 80 MMHG | TEMPERATURE: 98.4 F | HEIGHT: 65 IN | WEIGHT: 161 LBS | SYSTOLIC BLOOD PRESSURE: 132 MMHG | BODY MASS INDEX: 26.82 KG/M2

## 2021-12-01 DIAGNOSIS — Z00.00 GENERAL MEDICAL EXAM: Primary | ICD-10-CM

## 2021-12-01 DIAGNOSIS — N81.11 CYSTOCELE, MIDLINE: ICD-10-CM

## 2021-12-01 PROCEDURE — 85025 COMPLETE CBC W/AUTO DIFF WBC: CPT | Performed by: NURSE PRACTITIONER

## 2021-12-01 PROCEDURE — 36415 COLL VENOUS BLD VENIPUNCTURE: CPT | Performed by: NURSE PRACTITIONER

## 2021-12-01 PROCEDURE — 99396 PREV VISIT EST AGE 40-64: CPT | Performed by: NURSE PRACTITIONER

## 2021-12-01 PROCEDURE — 82306 VITAMIN D 25 HYDROXY: CPT | Performed by: NURSE PRACTITIONER

## 2021-12-01 PROCEDURE — 80053 COMPREHEN METABOLIC PANEL: CPT | Performed by: NURSE PRACTITIONER

## 2021-12-01 PROCEDURE — 82607 VITAMIN B-12: CPT | Performed by: NURSE PRACTITIONER

## 2021-12-01 PROCEDURE — 84443 ASSAY THYROID STIM HORMONE: CPT | Performed by: NURSE PRACTITIONER

## 2021-12-01 PROCEDURE — 83540 ASSAY OF IRON: CPT | Performed by: NURSE PRACTITIONER

## 2021-12-01 PROCEDURE — 80061 LIPID PANEL: CPT | Performed by: NURSE PRACTITIONER

## 2021-12-01 NOTE — PROGRESS NOTES
Chief Complaint   Patient presents with   • Annual Exam     pap smear, mammogram are scheduled 12/06/2021     Subjective   Nohelia Yo is a 61 y.o. female.      {Problem List  Visit Diagnosis   Encounters  Notes  Medications  Labs  Result Review Imaging  Media :23}     Presents with general exam and yearly check up -needs mammogram and yearly labs        The following portions of the patient's history were reviewed and updated as appropriate: allergies, current medications, past social history and problem list.    Review of Systems   Constitutional: Negative.  Negative for activity change, appetite change, chills, diaphoresis, fatigue, fever and unexpected weight change.   HENT: Negative for congestion, dental problem, drooling, ear discharge, ear pain, facial swelling, hearing loss and mouth sores.    Eyes: Negative.  Negative for photophobia, pain, discharge, redness, itching and visual disturbance.   Respiratory: Negative.  Negative for cough, shortness of breath, wheezing and stridor.    Cardiovascular: Negative.  Negative for chest pain, palpitations and leg swelling.   Gastrointestinal: Negative.  Negative for abdominal distention, abdominal pain, anal bleeding, blood in stool, constipation and diarrhea.   Endocrine: Negative.  Negative for cold intolerance, heat intolerance, polydipsia, polyphagia and polyuria.   Genitourinary: Negative.    Musculoskeletal: Negative.  Negative for arthralgias, back pain and gait problem.   Skin: Negative.    Allergic/Immunologic: Negative.  Negative for environmental allergies, food allergies and immunocompromised state.   Neurological: Negative.  Negative for dizziness, tremors, seizures, syncope, facial asymmetry, speech difficulty, weakness, light-headedness, numbness and headaches.   Hematological: Negative.  Negative for adenopathy. Does not bruise/bleed easily.   Psychiatric/Behavioral: Negative.  Negative for agitation, behavioral problems, confusion,  "decreased concentration, dysphoric mood and hallucinations. The patient is not nervous/anxious and is not hyperactive.        Objective   /80   Temp 98.4 °F (36.9 °C) (Temporal)   Ht 165.1 cm (65\")   Wt 73 kg (161 lb)   LMP  (LMP Unknown)   BMI 26.79 kg/m²   Physical Exam  Vitals and nursing note reviewed.   Constitutional:       General: She is not in acute distress.     Appearance: Normal appearance. She is not ill-appearing, toxic-appearing or diaphoretic.   HENT:      Head: Normocephalic and atraumatic.      Right Ear: Tympanic membrane normal. There is no impacted cerumen.      Left Ear: Tympanic membrane normal. There is no impacted cerumen.      Nose: Nose normal. No congestion or rhinorrhea.      Mouth/Throat:      Mouth: Mucous membranes are moist.      Pharynx: No oropharyngeal exudate or posterior oropharyngeal erythema.   Eyes:      General: No scleral icterus.        Right eye: No discharge.         Left eye: No discharge.      Pupils: Pupils are equal, round, and reactive to light.   Neck:      Vascular: No carotid bruit.   Cardiovascular:      Rate and Rhythm: Normal rate and regular rhythm.      Pulses: Normal pulses.      Heart sounds: No murmur heard.  No friction rub. No gallop.    Pulmonary:      Effort: Pulmonary effort is normal. No respiratory distress.      Breath sounds: No stridor. No wheezing, rhonchi or rales.   Chest:      Chest wall: No tenderness.   Abdominal:      General: Abdomen is flat. There is no distension.      Palpations: There is no mass.      Tenderness: There is no abdominal tenderness. There is no guarding or rebound.      Hernia: No hernia is present.   Genitourinary:     Vagina: No signs of injury and foreign body. No vaginal discharge, erythema, tenderness, bleeding, lesions or prolapsed vaginal walls.      Cervix: Normal.      Uterus: Normal.       Rectum: Guaiac result negative. No mass, tenderness, anal fissure or external hemorrhoid.      Comments: " Cystocele   Musculoskeletal:         General: No swelling, tenderness, deformity or signs of injury. Normal range of motion.      Cervical back: Normal range of motion and neck supple. No rigidity or tenderness.   Lymphadenopathy:      Cervical: No cervical adenopathy.   Skin:     General: Skin is warm.      Coloration: Skin is not jaundiced or pale.      Findings: No bruising, erythema, lesion or rash.   Neurological:      General: No focal deficit present.      Mental Status: She is alert and oriented to person, place, and time.      Cranial Nerves: No cranial nerve deficit.      Sensory: No sensory deficit.      Motor: No weakness.      Coordination: Coordination normal.      Gait: Gait normal.      Deep Tendon Reflexes: Reflexes normal.   Psychiatric:         Mood and Affect: Mood normal.         Behavior: Behavior normal.              Assessment/Plan     Problems Addressed this Visit     None      Visit Diagnoses     General medical exam    -  Primary    Relevant Orders    CBC & Differential    Comprehensive Metabolic Panel    Vitamin D 25 Hydroxy    Vitamin B12    TSH    Lipid Panel    Iron    Liquid-based Pap Smear, Screening    Ambulatory Referral to Physical Therapy Evaluate and treat    Cystocele, midline        Relevant Orders    Ambulatory Referral to Physical Therapy Evaluate and treat      Diagnoses       Codes Comments    General medical exam    -  Primary ICD-10-CM: Z00.00  ICD-9-CM: V70.9     Cystocele, midline     ICD-10-CM: N81.11  ICD-9-CM: 618.01          No orders of the defined types were placed in this encounter.    Current Outpatient Medications on File Prior to Visit   Medication Sig Dispense Refill   • [DISCONTINUED] doxycycline (MONODOX) 100 MG capsule Take 1 capsule by mouth 2 (Two) Times a Day. 28 capsule 1   • [DISCONTINUED] ketorolac (TORADOL) 10 MG tablet Take 1 tablet by mouth Every 6 (Six) Hours As Needed for Moderate Pain  or Severe Pain . 15 tablet 0   • [DISCONTINUED]  ondansetron ODT (ZOFRAN-ODT) 4 MG disintegrating tablet Place 1 tablet on the tongue Every 6 (Six) Hours As Needed for Nausea or Vomiting. 15 tablet 0   • [DISCONTINUED] oxyCODONE-acetaminophen (PERCOCET) 5-325 MG per tablet Take 1 tablet by mouth Every 6 (Six) Hours As Needed for Severe Pain . 15 tablet 0   • [DISCONTINUED] oxyCODONE-acetaminophen (PERCOCET) 7.5-325 MG per tablet Take 1-2 tablets by mouth Every 4 (Four) Hours As Needed (Pain). 10 tablet 0   • [DISCONTINUED] oxyCODONE-acetaminophen (PERCOCET) 7.5-325 MG per tablet Take 1 to 2 tablets by mouth Every 4 (Four) Hours As Needed for Pain. 10 tablet 0   • [DISCONTINUED] tamsulosin (FLOMAX) 0.4 MG capsule 24 hr capsule Take 1 capsule by mouth Daily. 30 capsule 0     No current facility-administered medications on file prior to visit.       20 minutes  Follow Up   Return in about 1 year (around 12/1/2022).     It's not just what you eat, but when you eat  Eat breakfast, and eat smaller meals throughout the day. A healthy breakfast can jumpstart your metabolism, while eating small, healthy meals (rather than the standard three large meals) keeps your energy up.   Avoid eating at night. Try to eat dinner earlier and fast for 14-16 hours until breakfast the next morning. Studies suggest that eating only when you’re most active and giving your digestive system a long break each day may help to regulate weight.        refer to pelvic wall pt for cystocele as directed -no changes -pt will improve symptoms at this time -see yearly as directed

## 2021-12-02 LAB
25(OH)D3 SERPL-MCNC: 15.4 NG/ML (ref 30–100)
ALBUMIN SERPL-MCNC: 4.7 G/DL (ref 3.5–5.2)
ALBUMIN/GLOB SERPL: 1.8 G/DL
ALP SERPL-CCNC: 62 U/L (ref 39–117)
ALT SERPL W P-5'-P-CCNC: 23 U/L (ref 1–33)
ANION GAP SERPL CALCULATED.3IONS-SCNC: 11.2 MMOL/L (ref 5–15)
AST SERPL-CCNC: 22 U/L (ref 1–32)
BASOPHILS # BLD AUTO: 0.04 10*3/MM3 (ref 0–0.2)
BASOPHILS NFR BLD AUTO: 0.7 % (ref 0–1.5)
BILIRUB SERPL-MCNC: 0.6 MG/DL (ref 0–1.2)
BUN SERPL-MCNC: 16 MG/DL (ref 6–20)
BUN/CREAT SERPL: 25.8 (ref 7–25)
CALCIUM SPEC-SCNC: 9.7 MG/DL (ref 8.6–10.5)
CHLORIDE SERPL-SCNC: 106 MMOL/L (ref 98–107)
CHOLEST SERPL-MCNC: 204 MG/DL (ref 0–200)
CO2 SERPL-SCNC: 23.8 MMOL/L (ref 22–29)
CREAT SERPL-MCNC: 0.62 MG/DL (ref 0.57–1)
DEPRECATED RDW RBC AUTO: 37 FL (ref 37–54)
EOSINOPHIL # BLD AUTO: 0.13 10*3/MM3 (ref 0–0.4)
EOSINOPHIL NFR BLD AUTO: 2.3 % (ref 0.3–6.2)
ERYTHROCYTE [DISTWIDTH] IN BLOOD BY AUTOMATED COUNT: 11.7 % (ref 12.3–15.4)
GFR SERPL CREATININE-BSD FRML MDRD: 99 ML/MIN/1.73
GLOBULIN UR ELPH-MCNC: 2.6 GM/DL
GLUCOSE SERPL-MCNC: 127 MG/DL (ref 65–99)
HCT VFR BLD AUTO: 43.1 % (ref 34–46.6)
HDLC SERPL-MCNC: 54 MG/DL (ref 40–60)
HGB BLD-MCNC: 14.7 G/DL (ref 12–15.9)
IMM GRANULOCYTES # BLD AUTO: 0.01 10*3/MM3 (ref 0–0.05)
IMM GRANULOCYTES NFR BLD AUTO: 0.2 % (ref 0–0.5)
IRON 24H UR-MRATE: 169 MCG/DL (ref 37–145)
LDLC SERPL CALC-MCNC: 128 MG/DL (ref 0–100)
LDLC/HDLC SERPL: 2.32 {RATIO}
LYMPHOCYTES # BLD AUTO: 2.23 10*3/MM3 (ref 0.7–3.1)
LYMPHOCYTES NFR BLD AUTO: 39 % (ref 19.6–45.3)
MCH RBC QN AUTO: 29.7 PG (ref 26.6–33)
MCHC RBC AUTO-ENTMCNC: 34.1 G/DL (ref 31.5–35.7)
MCV RBC AUTO: 87.1 FL (ref 79–97)
MONOCYTES # BLD AUTO: 0.31 10*3/MM3 (ref 0.1–0.9)
MONOCYTES NFR BLD AUTO: 5.4 % (ref 5–12)
NEUTROPHILS NFR BLD AUTO: 3 10*3/MM3 (ref 1.7–7)
NEUTROPHILS NFR BLD AUTO: 52.4 % (ref 42.7–76)
NRBC BLD AUTO-RTO: 0 /100 WBC (ref 0–0.2)
PLATELET # BLD AUTO: 252 10*3/MM3 (ref 140–450)
PMV BLD AUTO: 10.7 FL (ref 6–12)
POTASSIUM SERPL-SCNC: 4.2 MMOL/L (ref 3.5–5.2)
PROT SERPL-MCNC: 7.3 G/DL (ref 6–8.5)
RBC # BLD AUTO: 4.95 10*6/MM3 (ref 3.77–5.28)
SODIUM SERPL-SCNC: 141 MMOL/L (ref 136–145)
TRIGL SERPL-MCNC: 124 MG/DL (ref 0–150)
TSH SERPL DL<=0.05 MIU/L-ACNC: 1.65 UIU/ML (ref 0.27–4.2)
VIT B12 BLD-MCNC: 600 PG/ML (ref 211–946)
VLDLC SERPL-MCNC: 22 MG/DL (ref 5–40)
WBC NRBC COR # BLD: 5.72 10*3/MM3 (ref 3.4–10.8)

## 2021-12-08 LAB
LAB AP CASE REPORT: NORMAL
PATH INTERP SPEC-IMP: NORMAL

## 2021-12-10 RX ORDER — ERGOCALCIFEROL 1.25 MG/1
50000 CAPSULE ORAL
Qty: 5 CAPSULE | Refills: 11 | Status: SHIPPED | OUTPATIENT
Start: 2021-12-10

## 2021-12-20 ENCOUNTER — TELEPHONE (OUTPATIENT)
Dept: FAMILY MEDICINE CLINIC | Facility: CLINIC | Age: 61
End: 2021-12-20

## 2021-12-22 ENCOUNTER — OFFICE VISIT (OUTPATIENT)
Dept: FAMILY MEDICINE CLINIC | Facility: CLINIC | Age: 61
End: 2021-12-22

## 2021-12-22 DIAGNOSIS — Z12.4 CERVICAL CANCER SCREENING: Primary | ICD-10-CM

## 2021-12-22 PROCEDURE — 99024 POSTOP FOLLOW-UP VISIT: CPT | Performed by: NURSE PRACTITIONER

## 2021-12-22 NOTE — PROGRESS NOTES
No chief complaint on file.    Estephania Yo is a 61 y.o. female.           Presents with recheck pap -insufficient cells last visit        The following portions of the patient's history were reviewed and updated as appropriate: allergies, current medications, past social history and problem list.    Review of Systems   HENT: Negative for congestion, dental problem, ear pain, facial swelling, hearing loss and mouth sores.    Endocrine: Negative for cold intolerance, heat intolerance, polydipsia, polyphagia and polyuria.   Allergic/Immunologic: Negative.  Negative for environmental allergies, food allergies and immunocompromised state.   Neurological: Negative.  Negative for dizziness, tremors, seizures, syncope, facial asymmetry, speech difficulty, weakness, light-headedness, numbness and headaches.       Objective   LMP  (LMP Unknown)   Physical Exam  Vitals and nursing note reviewed.   Constitutional:       General: She is not in acute distress.     Appearance: Normal appearance. She is not ill-appearing, toxic-appearing or diaphoretic.   HENT:      Head: Normocephalic and atraumatic.      Right Ear: Tympanic membrane normal. There is no impacted cerumen.      Left Ear: Tympanic membrane normal. There is no impacted cerumen.      Nose: Nose normal. No congestion or rhinorrhea.      Mouth/Throat:      Mouth: Mucous membranes are moist.      Pharynx: No oropharyngeal exudate or posterior oropharyngeal erythema.   Eyes:      General: No scleral icterus.        Right eye: No discharge.         Left eye: No discharge.      Pupils: Pupils are equal, round, and reactive to light.   Neck:      Vascular: No carotid bruit.   Cardiovascular:      Rate and Rhythm: Normal rate and regular rhythm.      Pulses: Normal pulses.      Heart sounds: No murmur heard.  No friction rub. No gallop.    Pulmonary:      Effort: Pulmonary effort is normal. No respiratory distress.      Breath sounds: No stridor. No  wheezing, rhonchi or rales.   Chest:      Chest wall: No tenderness.   Abdominal:      General: Abdomen is flat. There is no distension.      Palpations: There is no mass.      Tenderness: There is no abdominal tenderness. There is no guarding or rebound.      Hernia: No hernia is present.   Genitourinary:     Vagina: No signs of injury and foreign body. No vaginal discharge, erythema, tenderness, bleeding, lesions or prolapsed vaginal walls.      Cervix: Normal.      Uterus: Normal.       Rectum: Guaiac result negative. No mass, tenderness, anal fissure or external hemorrhoid.      Comments: Cystocele   Musculoskeletal:         General: No swelling, tenderness, deformity or signs of injury. Normal range of motion.      Cervical back: Normal range of motion and neck supple. No rigidity or tenderness.   Lymphadenopathy:      Cervical: No cervical adenopathy.   Skin:     General: Skin is warm.      Coloration: Skin is not jaundiced or pale.      Findings: No bruising, erythema, lesion or rash.   Neurological:      General: No focal deficit present.      Mental Status: She is alert and oriented to person, place, and time.      Cranial Nerves: No cranial nerve deficit.      Sensory: No sensory deficit.      Motor: No weakness.      Coordination: Coordination normal.      Gait: Gait normal.      Deep Tendon Reflexes: Reflexes normal.   Psychiatric:         Mood and Affect: Mood normal.         Behavior: Behavior normal.              Assessment/Plan     Problems Addressed this Visit     None      Visit Diagnoses     Cervical cancer screening    -  Primary    Relevant Orders    Liquid-based Pap Smear, Screening      Diagnoses       Codes Comments    Cervical cancer screening    -  Primary ICD-10-CM: Z12.4  ICD-9-CM: V76.2          No orders of the defined types were placed in this encounter.    Current Outpatient Medications on File Prior to Visit   Medication Sig Dispense Refill   • vitamin D (ERGOCALCIFEROL) 1.25 MG  (43184 UT) capsule capsule Take 1 capsule by mouth Every 7 (Seven) Days. 5 capsule 11     No current facility-administered medications on file prior to visit.       15  Follow Up   No follow-ups on file.     No charge visit-recollect only

## 2021-12-28 ENCOUNTER — TELEPHONE (OUTPATIENT)
Dept: FAMILY MEDICINE CLINIC | Facility: CLINIC | Age: 61
End: 2021-12-28

## 2021-12-29 NOTE — TELEPHONE ENCOUNTER
Ms. Hernandes has been told that her Pap Smear is still pending.   I did tell her that as soon as we get the results we will let her know

## 2022-01-03 LAB
LAB AP CASE REPORT: NORMAL
PATH INTERP SPEC-IMP: NORMAL

## 2022-01-04 ENCOUNTER — HOSPITAL ENCOUNTER (OUTPATIENT)
Dept: PHYSICAL THERAPY | Facility: HOSPITAL | Age: 62
Setting detail: THERAPIES SERIES
Discharge: HOME | End: 2022-01-04

## 2022-01-04 DIAGNOSIS — N81.89 PELVIC FLOOR WEAKNESS IN FEMALE: Primary | ICD-10-CM

## 2022-01-04 DIAGNOSIS — N81.11 CYSTOCELE, MIDLINE: ICD-10-CM

## 2022-01-04 PROCEDURE — 97162 PT EVAL MOD COMPLEX 30 MIN: CPT | Performed by: PHYSICAL THERAPIST

## 2022-01-04 NOTE — THERAPY EVALUATION
Outpatient Physical Therapy Pelvic Health Initial Evaluation  [facility]     Patient Name: Nohelia Yo  : 1960  MRN: [id number]  Today's Date: 2022        Visit Date: 2022  Visit number:   Recheck: 22  Insurance: [place]    Patient Active Problem List   Diagnosis   • Encounter to establish care   • Neck pain   • Large breasts   • Encounter for screening mammogram for malignant neoplasm of breast   • DDD (degenerative disc disease), cervical   • Calculus of kidney with calculus of ureter   • Calculus of ureter        Past Medical History:   Diagnosis Date   • Abnormal computed tomography scan     - hilar and mediastinal lymphadenopathy      • Abnormal mammography     microcalcifications,left      • Cystocele, midline    • Dyspnea    • Extrinsic allergic alveolitis (HCC)    • Hallux valgus         Past Surgical History:   Procedure Laterality Date   • BREAST BIOPSY  10/19/2009    Biop of L using percut vacuum assisted rotating device & image guidance. Placement of metallic localization clip. Stereotactic location guidance w/ radiologic supervision & interpretation. radiologic exam of surgical specimen Abn L micro calcificat   • COLONOSCOPY N/A 3/27/2018    Procedure: COLONOSCOPY;  Surgeon: Cam Salgado MD;  Location: [facility] ENDOSCOPY;  Service: General   • CYSTOSCOPY, URETEROSCOPY, RETROGRADE PYELOGRAM, STENT INSERTION Left 12/10/2020    Procedure: CYSTOSCOPY,stone manipulation, LEFT RETROGRADE, left  STENT PLACEMENT;  Surgeon: Cam Brooks MD;  Location: [facility] OR;  Service: Urology;  Laterality: Left;   • CYSTOSCOPY, URETEROSCOPY, RETROGRADE PYELOGRAM, STENT INSERTION Left 2020    Procedure: CYSTOSCOPY, LEFT RETROGRADE, URETEROSCOPY, LASER LITHOTRIPSY, STENT PLACEMENT;  Surgeon: Cam Brooks MD;  Location: [facility] OR;  Service: Urology;  Laterality: Left;   • FOOT SURGERY Bilateral 2012    1st metatarsal osteotomy,right foot. Metatarsalgia with  shortened 1st metatarsal   • METATARSAL OSTEOTOMY  2012    First metatarsal osteotomy, right foot     Current Outpatient Medications on File Prior to Encounter   Medication Sig Dispense Refill   • vitamin D (ERGOCALCIFEROL) 1.25 MG (78377 UT) capsule capsule Take 1 capsule by mouth Every 7 (Seven) Days. 5 capsule 11     No current facility-administered medications on file prior to encounter.     No Known Allergies      Visit Dx:    ICD-10-CM ICD-9-CM   1. Pelvic floor weakness in female  N81.89 618.89   2. Cystocele, midline  N81.11 618.01            Pelvic Health     Row Name 22 0800             Pregnancy Questions    Number of Pregnancies 2  -SW      Number of Miscarriages 0  -SW      Has the patient had an ? No  -SW      Number of Children 2  -SW      Type of Previous Deliveries Vaginal  forceps delivery with first.  -SW              Pain Assessment    Pain Assessment No/denies pain  -SW              Pelvic Floor Muscle    Patient/Parent/Guardian Consented to Internal Pelvic Floor Exam Yes  -SW      Strength (Right) 3: Squeeze with/without lift  -SW      Strength (Left) 3: Squeeze with/without lift  -SW      Symmetry of Sustained Maximal Contraction Symmetrical  -SW      Fast Contraction (# of 1 sec contractions performed) 8  -SW      Internal Pelvic Floor Comments min tenderness noted along post pelvic floor/levator ani group but nothing significant.  Tissue with some loss of rugae noted.  Red at introitus but no other discoloration noted.  Canalou's glans normal.  POP grade II with valsalva showing little to no hypermobility.  MMT 3/5.  Difficulty with completion of PF contraction with diaphragmatic breath.  -SW      External Pelvic Floor Comments no regions of hypopigmentation noted.  Unremarkable appearance to external perineum.  No evidence of hemorrhoids.  -SW              Observations    Perineal Observation Performed? Yes  -SW              Observation of Contraction in Perineum    Anal  Saima Present  -SW      Perineal Body Lift Present  -SW              Pelvic Floor    Ability to Isolate Contraction of Pelvic Floor No  -SW      Overflow from Adjacent Muscles Upper Abdominals; Gluteus Wil  -SW              Prolapse (Pop-Q)    Cystocele Stage II  -SW      Prolapse Comments grade II prolapse with no recognized hypermobility noted to POP.  -SW              SEMG Evaluation    Pelvic Floor Electrode Internal Vaginal  -SW      Baseline Resting Yes  -SW      SEMG Evaluation Comments able to show resting tone at baselines with intermittent elevation above baselines.  Difficult with isolation of contractions without forceful contractions to PF.  Tried with instruction of diaphragmatic breathing, but remained challenged with coordination.  QF contraction to PF increased TA activation as well.  NO endurance assessed this visit as TA activation was greater with attempted hold of contraction.  -              Education Provided On:    Education Points HEP; Behavioral modifications  -      Method of Delivery Verbal; Demonstration; Written  -      Education Provided To Patient  -SW      Level of Understanding Teach back education performed; Verbalized; Demonstrated  -SW      HEP Comments HEP with bladder diary x 3 days; diaphragmatic breathing x 10 minutes with buttocks elevated in rest position.  -              Outcome Measures    Outcome Measure Options Quality of Life Symptom Disability Index  -              Incontinence Impact Questionnaire    Ability to do household chores (cooking, housecleaning, laundry)? 0  -SW      Physical recreation such as walking, swimming, or other exercise? 1  -SW      Entertainment activities (movies, concerts, etc)? 1  -SW      Ability to travel by car or bus more than 30 minutes from home? 0  -SW      Participation in social activities outside your home? 0  -SW      Emotional health (nervousness, depression, etc.)? 0  -SW      Feeling frustrated? 1  -SW       Incontinence Impact Total 3  -SW              Urogenital Distress Inventory    Frequent urination? 0  -SW      Urine leakage related to the feeling of urgency? 1  -SW      Urine leakage related to physical activity, coughing, or sneezing? 2  -SW      Small amounts of urine leakage (drops)? 0  -SW      Difficulty emptying your bladder? 0  -SW      Pain or discomfort in the lower abdominal or genital area 1  -SW      A feeling of bulging or protrusion in the vaginal area? 0  -SW      Bulging or protrusion you can see in the vaginal area? 0  -SW      Urogenital Distress Total 4  -SW      Total 7  -SW            User Key  (r) = Recorded By, (t) = Taken By, (c) = Cosigned By    Initials Name Provider Type    SW Rosa Brower, PT DPT Physical Therapist               PT Ortho     Row Name 01/04/22 0800       Subjective Comments    Subjective Comments Provider noted prolapse with Pap smear and suggested patient receive PF therapy.   Also notes intermittent episodes of JACKIE along with bladder urgency.  Pt recalls a recent episode with kidney stone that created some vomiting where she also noted bladder leakage.  Lithrotripsy completed in Dec and no long with pain from kidney stones.  Urination schedule variable due to type of work.  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  Nocturia intermittent but no more than 1 per night.  Usually awakened due to cramping in legs vs a need to urinate.  She often denies any urge to void.  Stream flows relatively consistent.  Bowels move ok though noted with intermittent constipation.  However, patient feels she manages bowels well.  BM usually daily but relatively managed and not problematic.  [place] stool scale of 4. Intake usually water and tea.  Pt denies pad usage on regular basis.  -SW       Subjective Pain    Able to rate subjective pain? yes  -SW    Pre-Treatment Pain Level 0  -SW     Post-Treatment Pain Level 0  -SW       Posture/Observations    Posture- WNL Posture is WNL  -SW    Posture/Observations Comments patient is alert and oriented.  No distress in appearance. Able to give adequate history as it pertains to current condition.  -          User Key  (r) = Recorded By, (t) = Taken By, (c) = Cosigned By    Initials Name Provider Type    SW Rosa Brower, PT DPT Physical Therapist                            PT Assessment/Plan     Row Name 01/04/22 1100          PT Assessment    Functional Limitations Limitation in home management; Limitations in community activities; Performance in leisure activities; Performance in self-care ADL; Performance in work activities  -     Impairments Impaired muscle endurance; Impaired muscle power; Muscle strength; Poor body mechanics; Other (comment)  dec diaphragmatic breathing; JACKIE; POP  -SW     Assessment Comments Patient is a 60 yo female presenting to clinic with c/o pelvic floor instability and weakness resulting in POP of ant wall along with JACKIE.  She would benefit from skilled therapy to address muscle coordination and strength to assist with bladder control and stability.  -SW     Rehab Potential Good  -SW     Patient/caregiver participated in establishment of treatment plan and goals Yes  -SW     Patient would benefit from skilled therapy intervention Yes  -SW            PT Plan    PT Frequency 1x/week  -     Planned CPT's? PT EVAL MOD COMPLELITY: 76361; PT THER PROC EA 15 MIN: 66719; PT THER ACT EA 15 MIN: 19854; PT NEUROMUSC RE-EDUCATION EA 15 MIN: 24053; PT MANUAL THERAPY EA 15 MIN: 65783; PT SELF CARE/HOME MGMT/TRAIN EA 15: 70098; PT ELECTRICAL STIM UNATTEND:   -     PT Plan Comments bladder/bowel dietary recommendation/ diary review; uptraining to PF; diaphragmatic breathing exercises, strength and coordination with stabilization; posture and body mechanics to assist with pressure management.  -           User Key  (r) =  Recorded By, (t) = Taken By, (c) = Cosigned By    Initials Name Provider Type    SW Rosa Brower, PT DPT Physical Therapist                   OP Exercises     Row Name 01/04/22 0800             Subjective Comments    Subjective Comments Provider noted prolapse with Pap smear and suggested patient receive PF therapy.   Also notes intermittent episodes of JACKIE along with bladder urgency.  Pt recalls a recent episode with kidney stone that created some vomiting where she also noted bladder leakage.  Lithrotripsy completed in Dec and no long with pain from kidney stones.  Urination schedule variable due to type of work.  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  Nocturia intermittent but no more than 1 per night.  Usually awakened due to cramping in legs vs a need to urinate.  She often denies any urge to void.  Stream flows relatively consistent.  Bowels move ok though noted with intermittent constipation.  However, patient feels she manages bowels well.  BM usually daily but relatively managed and not problematic.  [place] stool scale of 4. Intake usually water and tea.  Pt denies pad usage on regular basis.  -SW              Subjective Pain    Able to rate subjective pain? yes  -SW      Pre-Treatment Pain Level 0  -SW      Post-Treatment Pain Level 0  -SW              Exercise 1    Exercise Name 1 bladder and bowel diary  -SW      Additional Comments complete 3 days  -SW              Exercise 2    Exercise Name 2 diaphragmatic breathing  -SW      Time 2 5'min  -SW      Additional Comments time spent with education on proper diaphragmatic breathing with encouragement to dec accessory breath.  -SW              Exercise 3    Exercise Name 3 diaphragmatic breath with PF activation  -SW      Time 3 5'min  -SW      Additional Comments challenged with coordination of PF with diaphragmatic breath.  -SW            User Key   (r) = Recorded By, (t) = Taken By, (c) = Cosigned By    Initials Name Provider Type    SW Rosa Brower, PT DPT Physical Therapist                             PT OP Goals     Row Name 01/04/22 1000          PT Short Term Goals    STG Date to Achieve 02/04/22  -     STG 1 patient to be independent with bladder diary completion  -     STG 1 Progress New  -     STG 2 patient to verbalize understanding of dietary irritants and reduce those from intake as to dec  urgency and JACKIE episodes.  -SW     STG 2 Progress New  -     STG 3 patient to begin bladder void prompting at intervals of 3 hours with 75% consistency and compliance.  -SW     STG 3 Progress New  -     STG 4 patient to demonstrate proper void position to assist with elimination of bladder effectively.  -     STG 4 Progress New  -     STG 5 patient to coordinate diaphragmatic breathing x 3'min with PF activation properly to encourage pelvic diaphragm movement with correct timing.  -     STG 5 Progress New  -            Long Term Goals    LTG Date to Achieve 06/03/22  -     LTG 1 patient to experience void schedule of every 3-4 hours consistently to promote bladder health.  -     LTG 1 Progress New  -     LTG 2 patient to present with improved QOL SDI score of 3 or less indicating improved function with bladder health.  -     LTG 2 Progress New  -     LTG 3 patient to show improved control to PF in seated and standing positions against gravity to assist with JACKIE episodes.  -     LTG 3 Progress New  -     LTG 4 improve bladder health and function with less reports of heaviness with prolonged standing position by 75%.  -     LTG 4 Progress New  -            Time Calculation    PT Goal Re-Cert Due Date 02/04/22  -           User Key  (r) = Recorded By, (t) = Taken By, (c) = Cosigned By    Initials Name Provider Type    Rosa Hugo, PT DPT Physical Therapist                Therapy Education  Given: HEP,  Symptoms/condition management, Posture/body mechanics  Program: New  How Provided: Verbal, Demonstration, Written  Provided to: Patient  Level of Understanding: Teach back education performed, Verbalized, Demonstrated               Time Calculation:   Start Time: 0820  Stop Time: 0930  Time Calculation (min): 70 min  Therapy Charges for Today     Code Description Service Date Service Provider Modifiers Qty    [id number] HC-PT EVAL MOD COMPLEXITY 5 1/4/2022 Rosa Brower, PT DPT  1                  Rosa Brower, PT DPT  1/4/2022

## 2022-02-14 ENCOUNTER — APPOINTMENT (OUTPATIENT)
Dept: PHYSICAL THERAPY | Facility: HOSPITAL | Age: 62
End: 2022-02-14

## 2023-01-04 ENCOUNTER — TELEPHONE (OUTPATIENT)
Dept: FAMILY MEDICINE CLINIC | Facility: CLINIC | Age: 63
End: 2023-01-04
Payer: COMMERCIAL

## 2023-01-04 NOTE — TELEPHONE ENCOUNTER
[facility] VACCINE. BEFORE I RESPOND, I WANTED TO MAKE SURE SHE IS DUE FOR ONE. PLEASE ADVISE

## 2023-09-14 ENCOUNTER — OFFICE VISIT (OUTPATIENT)
Dept: FAMILY MEDICINE CLINIC | Facility: CLINIC | Age: 63
End: 2023-09-14
Payer: COMMERCIAL

## 2023-09-14 VITALS
OXYGEN SATURATION: 99 % | DIASTOLIC BLOOD PRESSURE: 80 MMHG | BODY MASS INDEX: 28 KG/M2 | HEIGHT: 64 IN | WEIGHT: 164 LBS | SYSTOLIC BLOOD PRESSURE: 128 MMHG | HEART RATE: 81 BPM

## 2023-09-14 DIAGNOSIS — Z00.00 GENERAL MEDICAL EXAM: Primary | ICD-10-CM

## 2023-09-14 DIAGNOSIS — Z12.31 VISIT FOR SCREENING MAMMOGRAM: ICD-10-CM

## 2023-09-14 LAB
QT INTERVAL: 424 MS
QTC INTERVAL: 437 MS

## 2023-09-14 PROCEDURE — 93010 ELECTROCARDIOGRAM REPORT: CPT | Performed by: INTERNAL MEDICINE

## 2023-09-14 PROCEDURE — 93005 ELECTROCARDIOGRAM TRACING: CPT | Performed by: NURSE PRACTITIONER

## 2023-09-14 PROCEDURE — 99396 PREV VISIT EST AGE 40-64: CPT | Performed by: NURSE PRACTITIONER

## 2023-09-14 RX ORDER — ONDANSETRON 4 MG/1
4 TABLET, ORALLY DISINTEGRATING ORAL EVERY 8 HOURS PRN
Qty: 30 TABLET | Refills: 11 | Status: SHIPPED | OUTPATIENT
Start: 2023-09-14

## 2023-09-14 RX ORDER — TOBRAMYCIN 3 MG/ML
2 SOLUTION/ DROPS OPHTHALMIC
Qty: 5 ML | Refills: 11 | Status: SHIPPED | OUTPATIENT
Start: 2023-09-14

## 2023-09-14 RX ORDER — DEXAMETHASONE 4 MG/1
4 TABLET ORAL 2 TIMES DAILY WITH MEALS
Qty: 20 TABLET | Refills: 1 | Status: SHIPPED | OUTPATIENT
Start: 2023-09-14

## 2023-09-14 RX ORDER — ALBUTEROL SULFATE 90 UG/1
2 AEROSOL, METERED RESPIRATORY (INHALATION) EVERY 4 HOURS PRN
Qty: 18 G | Refills: 5 | Status: SHIPPED | OUTPATIENT
Start: 2023-09-14

## 2023-09-14 RX ORDER — AZITHROMYCIN 250 MG/1
TABLET, FILM COATED ORAL
Qty: 6 TABLET | Refills: 0 | Status: SHIPPED | OUTPATIENT
Start: 2023-09-14

## 2023-09-14 RX ORDER — CIPROFLOXACIN 500 MG/1
500 TABLET, FILM COATED ORAL 2 TIMES DAILY
Qty: 20 TABLET | Refills: 0 | Status: SHIPPED | OUTPATIENT
Start: 2023-09-14

## 2023-09-14 RX ORDER — SULFAMETHOXAZOLE AND TRIMETHOPRIM 800; 160 MG/1; MG/1
1 TABLET ORAL 2 TIMES DAILY
Qty: 20 TABLET | Refills: 0 | Status: SHIPPED | OUTPATIENT
Start: 2023-09-14

## 2023-09-14 RX ORDER — ACETAZOLAMIDE 250 MG/1
250 TABLET ORAL 2 TIMES DAILY
Qty: 60 TABLET | Refills: 1 | Status: SHIPPED | OUTPATIENT
Start: 2023-09-14

## 2023-09-14 RX ORDER — METRONIDAZOLE 250 MG/1
250 TABLET ORAL 3 TIMES DAILY
Qty: 30 TABLET | Refills: 1 | Status: SHIPPED | OUTPATIENT
Start: 2023-09-14

## 2023-09-14 RX ORDER — FLUTICASONE PROPIONATE 220 UG/1
1 AEROSOL, METERED RESPIRATORY (INHALATION)
Qty: 12 G | Refills: 11 | Status: SHIPPED | OUTPATIENT
Start: 2023-09-14

## 2023-09-14 RX ORDER — AMOXICILLIN AND CLAVULANATE POTASSIUM 500; 125 MG/1; MG/1
1 TABLET, FILM COATED ORAL 3 TIMES DAILY
Qty: 20 TABLET | Refills: 0 | Status: SHIPPED | OUTPATIENT
Start: 2023-09-14

## 2023-09-14 RX ORDER — CEPHALEXIN 500 MG/1
500 CAPSULE ORAL 3 TIMES DAILY
Qty: 30 CAPSULE | Refills: 0 | Status: SHIPPED | OUTPATIENT
Start: 2023-09-14

## 2023-09-14 NOTE — PROGRESS NOTES
Chief Complaint   Patient presents with    Annual Exam     Subjective   Nohelia Yo is a 63 y.o. female.           History of Present Illness  Presents with general exam and yearly check up -needs mammogram and yearly labs     Patient is leaving for a 3 week wilderness hiking trip next month and wanting rx to take with her -she is not planning to take it but would like to have it available if needed      The following portions of the patient's history were reviewed and updated as appropriate: allergies, current medications, past social history and problem list.    Review of Systems   Constitutional: Negative.  Negative for activity change, appetite change, chills, diaphoresis, fatigue, fever and unexpected weight change.   HENT:  Negative for congestion, dental problem, drooling, ear discharge, ear pain, facial swelling, hearing loss, mouth sores, nosebleeds, postnasal drip, rhinorrhea, sinus pressure, sinus pain, sneezing, sore throat and tinnitus.    Eyes: Negative.  Negative for photophobia, pain, discharge, redness, itching and visual disturbance.   Respiratory: Negative.  Negative for apnea, cough, chest tightness, shortness of breath, wheezing and stridor.    Cardiovascular: Negative.  Negative for chest pain, palpitations and leg swelling.   Gastrointestinal: Negative.  Negative for abdominal distention, abdominal pain, anal bleeding, blood in stool, constipation and diarrhea.   Endocrine: Negative.  Negative for cold intolerance, heat intolerance, polydipsia, polyphagia and polyuria.   Genitourinary: Negative.  Negative for difficulty urinating, dyspareunia and dysuria.   Musculoskeletal: Negative.  Negative for arthralgias, back pain and gait problem.   Skin: Negative.    Allergic/Immunologic: Negative.  Negative for environmental allergies, food allergies and immunocompromised state.   Neurological: Negative.  Negative for dizziness, tremors, seizures, syncope, facial asymmetry, speech difficulty,  "weakness, light-headedness, numbness and headaches.   Hematological: Negative.  Negative for adenopathy. Does not bruise/bleed easily.   Psychiatric/Behavioral: Negative.  Negative for agitation, behavioral problems, confusion, decreased concentration, dysphoric mood, hallucinations and self-injury. The patient is not nervous/anxious and is not hyperactive.      Objective   /80   Pulse 81   Ht 161.3 cm (63.5\")   Wt 74.4 kg (164 lb)   LMP  (LMP Unknown)   SpO2 99%   BMI 28.60 kg/m²   Physical Exam  Vitals and nursing note reviewed.   Constitutional:       General: She is not in acute distress.     Appearance: Normal appearance. She is not ill-appearing, toxic-appearing or diaphoretic.   HENT:      Head: Normocephalic and atraumatic.      Right Ear: Tympanic membrane normal. There is no impacted cerumen.      Left Ear: Tympanic membrane normal. There is no impacted cerumen.      Nose: Nose normal. No congestion or rhinorrhea.      Mouth/Throat:      Mouth: Mucous membranes are moist.      Pharynx: No oropharyngeal exudate or posterior oropharyngeal erythema.   Eyes:      General: No scleral icterus.        Right eye: No discharge.         Left eye: No discharge.      Pupils: Pupils are equal, round, and reactive to light.   Neck:      Vascular: No carotid bruit.   Cardiovascular:      Rate and Rhythm: Normal rate and regular rhythm.      Pulses: Normal pulses.      Heart sounds: No murmur heard.    No friction rub. No gallop.      Comments: EKG good   Pulmonary:      Effort: Pulmonary effort is normal. No respiratory distress.      Breath sounds: No stridor. No wheezing, rhonchi or rales.   Chest:      Chest wall: No tenderness.   Breasts:     Right: Normal.      Left: Normal.   Abdominal:      General: Abdomen is flat. There is no distension.      Palpations: There is no mass.      Tenderness: There is no abdominal tenderness. There is no guarding or rebound.      Hernia: No hernia is present. "   Genitourinary:     Vagina: No signs of injury and foreign body. No vaginal discharge, erythema, tenderness, bleeding, lesions or prolapsed vaginal walls.      Cervix: Normal.      Uterus: Normal.       Rectum: Guaiac result negative. No mass, tenderness, anal fissure or external hemorrhoid.   Musculoskeletal:         General: No swelling, tenderness, deformity or signs of injury. Normal range of motion.      Cervical back: Normal range of motion and neck supple. No rigidity or tenderness.   Lymphadenopathy:      Cervical: No cervical adenopathy.   Skin:     General: Skin is warm.      Coloration: Skin is not jaundiced or pale.      Findings: No bruising, erythema, lesion or rash.   Neurological:      General: No focal deficit present.      Mental Status: She is alert and oriented to person, place, and time.      Cranial Nerves: No cranial nerve deficit.      Sensory: No sensory deficit.      Motor: No weakness.      Coordination: Coordination normal.      Gait: Gait normal.      Deep Tendon Reflexes: Reflexes normal.   Psychiatric:         Mood and Affect: Mood normal.         Behavior: Behavior normal.            Assessment & Plan     Problems Addressed this Visit    None  Visit Diagnoses       Visit for screening mammogram    -  Primary    Relevant Orders    Mammo Screening Digital Tomosynthesis Bilateral With CAD    General medical exam        Relevant Orders    Mammo Screening Digital Tomosynthesis Bilateral With CAD    CBC & Differential    Comprehensive Metabolic Panel    Hemoglobin A1c    Lipid Panel    Iron    Vitamin D,25-Hydroxy    Vitamin B12    TSH          Diagnoses         Codes Comments    Visit for screening mammogram    -  Primary ICD-10-CM: Z12.31  ICD-9-CM: V76.12     General medical exam     ICD-10-CM: Z00.00  ICD-9-CM: V70.9            No orders of the defined types were placed in this encounter.    Current Outpatient Medications on File Prior to Visit   Medication Sig Dispense Refill     vitamin D (ERGOCALCIFEROL) 1.25 MG (41137 UT) capsule capsule Take 1 capsule by mouth Every 7 (Seven) Days. 5 capsule 11     No current facility-administered medications on file prior to visit.       20 minutes   Follow Up   No follow-ups on file.      Mammogram  Labs as directed   EKG good  Meds only if needed to take with her on her hiking trip in the wilderness-do not use if not needed-patient agrees   Cleared for hiking trip-Verified with Dr Lary David    It's not just what you eat, but when you eat  Eat breakfast, and eat smaller meals throughout the day. A healthy breakfast can jumpstart your metabolism, while eating small, healthy meals (rather than the standard three large meals) keeps your energy up.   Avoid eating at night. Try to eat dinner earlier and fast for 14-16 hours until breakfast the next morning. Studies suggest that eating only when you’re most active and giving your digestive system a long break each day may help to regulate weight.

## (undated) DEVICE — ADHS LIQ MASTISOL 2/3ML

## (undated) DEVICE — SOL PVPI SPRY BETADINE 3OZ

## (undated) DEVICE — CANN SMPL SOFTECH BIFLO ETCO2 A/M 7FT

## (undated) DEVICE — CONTAINER,SPECIMEN,OR STERILE,4OZ: Brand: MEDLINE

## (undated) DEVICE — PK CYSTO LF 60

## (undated) DEVICE — GLV SURG NEOLON 2G PF LF 6.5 STRL

## (undated) DEVICE — NITINOL WIRE WITH HYDROPHILIC TIP: Brand: SENSOR

## (undated) DEVICE — GW PTFE FIX/CORE FLXTIP .038 3X150CM

## (undated) DEVICE — CATH URETRL OPN/END 5F70CM

## (undated) DEVICE — GLV SURG SENSICARE PI LF PF 8 GRN STRL

## (undated) DEVICE — SOL IRR NACL 0.9PCT 3000ML

## (undated) DEVICE — SINGLE-USE DIGITAL FLEXIBLE URETEROSCOPE: Brand: LITHOVUE

## (undated) DEVICE — GLV SURG NEOLON 2G PF LF 7.5 STRL

## (undated) DEVICE — ADAPT/Y SCPE GATEWAY ADVNTGE

## (undated) DEVICE — URETERAL ACCESS SHEATH SET: Brand: NAVIGATOR HD

## (undated) DEVICE — SYS IRR PUMP SGL ACTN VAC SYR 10CC

## (undated) DEVICE — FIBR LASR MOSES 365 DFL 6J 80HZ 120W